# Patient Record
Sex: FEMALE | Race: OTHER | Employment: OTHER | URBAN - METROPOLITAN AREA
[De-identification: names, ages, dates, MRNs, and addresses within clinical notes are randomized per-mention and may not be internally consistent; named-entity substitution may affect disease eponyms.]

---

## 2018-03-06 ENCOUNTER — HOSPITAL ENCOUNTER (OUTPATIENT)
Age: 78
Setting detail: OBSERVATION
Discharge: HOME | End: 2018-03-07
Attending: EMERGENCY MEDICINE | Admitting: EMERGENCY MEDICINE
Payer: MEDICARE

## 2018-03-06 ENCOUNTER — APPOINTMENT (OUTPATIENT)
Dept: GENERAL RADIOLOGY | Age: 78
End: 2018-03-06
Payer: MEDICARE

## 2018-03-06 ENCOUNTER — APPOINTMENT (OUTPATIENT)
Dept: ULTRASOUND IMAGING | Age: 78
End: 2018-03-06
Payer: MEDICARE

## 2018-03-06 DIAGNOSIS — I10 HYPERTENSION, UNSPECIFIED TYPE: ICD-10-CM

## 2018-03-06 DIAGNOSIS — R79.89 ELEVATED SERUM CREATININE: ICD-10-CM

## 2018-03-06 DIAGNOSIS — R19.7 DIARRHEA, UNSPECIFIED TYPE: Primary | ICD-10-CM

## 2018-03-06 DIAGNOSIS — R53.83 FATIGUE, UNSPECIFIED TYPE: ICD-10-CM

## 2018-03-06 DIAGNOSIS — R11.0 NAUSEA: ICD-10-CM

## 2018-03-06 LAB
-: NORMAL
ABSOLUTE EOS #: 0.08 K/UL (ref 0–0.44)
ABSOLUTE IMMATURE GRANULOCYTE: <0.03 K/UL (ref 0–0.3)
ABSOLUTE LYMPH #: 1.19 K/UL (ref 1.1–3.7)
ABSOLUTE MONO #: 0.91 K/UL (ref 0.1–1.2)
AMORPHOUS: NORMAL
ANION GAP SERPL CALCULATED.3IONS-SCNC: 16 MMOL/L (ref 9–17)
BACTERIA: NORMAL
BASOPHILS # BLD: 1 % (ref 0–2)
BASOPHILS ABSOLUTE: 0.06 K/UL (ref 0–0.2)
BILIRUBIN URINE: NEGATIVE
BUN BLDV-MCNC: 26 MG/DL (ref 8–23)
BUN/CREAT BLD: ABNORMAL (ref 9–20)
CALCIUM SERPL-MCNC: 9.5 MG/DL (ref 8.6–10.4)
CAMPYLOBACTER PCR: NORMAL
CASTS UA: NORMAL /LPF (ref 0–8)
CHLORIDE BLD-SCNC: 99 MMOL/L (ref 98–107)
CO2: 22 MMOL/L (ref 20–31)
COLOR: YELLOW
CREAT SERPL-MCNC: 1.68 MG/DL (ref 0.5–0.9)
CRYSTALS, UA: NORMAL /HPF
DIFFERENTIAL TYPE: ABNORMAL
EKG ATRIAL RATE: 64 BPM
EKG P AXIS: 50 DEGREES
EKG P-R INTERVAL: 134 MS
EKG Q-T INTERVAL: 426 MS
EKG QRS DURATION: 88 MS
EKG QTC CALCULATION (BAZETT): 439 MS
EKG R AXIS: 29 DEGREES
EKG T AXIS: 50 DEGREES
EKG VENTRICULAR RATE: 64 BPM
EOSINOPHILS RELATIVE PERCENT: 1 % (ref 1–4)
EPITHELIAL CELLS UA: NORMAL /HPF (ref 0–5)
GFR AFRICAN AMERICAN: 36 ML/MIN
GFR NON-AFRICAN AMERICAN: 29 ML/MIN
GFR SERPL CREATININE-BSD FRML MDRD: ABNORMAL ML/MIN/{1.73_M2}
GFR SERPL CREATININE-BSD FRML MDRD: ABNORMAL ML/MIN/{1.73_M2}
GLUCOSE BLD-MCNC: 102 MG/DL (ref 70–99)
GLUCOSE URINE: NEGATIVE
HCT VFR BLD CALC: 42.2 % (ref 36.3–47.1)
HEMOGLOBIN: 13.2 G/DL (ref 11.9–15.1)
IMMATURE GRANULOCYTES: 0 %
KETONES, URINE: NEGATIVE
LACTOFERRIN, QUAL: NORMAL
LEUKOCYTE ESTERASE, URINE: NEGATIVE
LYMPHOCYTES # BLD: 18 % (ref 24–43)
Lab: NORMAL
MAGNESIUM: 2.1 MG/DL (ref 1.6–2.6)
MCH RBC QN AUTO: 27.7 PG (ref 25.2–33.5)
MCHC RBC AUTO-ENTMCNC: 31.3 G/DL (ref 28.4–34.8)
MCV RBC AUTO: 88.7 FL (ref 82.6–102.9)
MICRO OVA & PARASITES: NORMAL
MONOCYTES # BLD: 14 % (ref 3–12)
MUCUS: NORMAL
NITRITE, URINE: NEGATIVE
NRBC AUTOMATED: 0 PER 100 WBC
OTHER OBSERVATIONS UA: NORMAL
PDW BLD-RTO: 14.7 % (ref 11.8–14.4)
PH UA: 5.5 (ref 5–8)
PLATELET # BLD: 144 K/UL (ref 138–453)
PLATELET ESTIMATE: ABNORMAL
PMV BLD AUTO: 11.2 FL (ref 8.1–13.5)
POC TROPONIN I: 0 NG/ML (ref 0–0.1)
POC TROPONIN INTERP: NORMAL
POTASSIUM SERPL-SCNC: 4.6 MMOL/L (ref 3.7–5.3)
PROTEIN UA: NEGATIVE
RBC # BLD: 4.76 M/UL (ref 3.95–5.11)
RBC # BLD: ABNORMAL 10*6/UL
RBC UA: NORMAL /HPF (ref 0–4)
RENAL EPITHELIAL, UA: NORMAL /HPF
SALMONELLA PCR: NORMAL
SEG NEUTROPHILS: 66 % (ref 36–65)
SEGMENTED NEUTROPHILS ABSOLUTE COUNT: 4.44 K/UL (ref 1.5–8.1)
SHIGATOXIN GENE PCR: NORMAL
SHIGELLA SP PCR: NORMAL
SODIUM BLD-SCNC: 137 MMOL/L (ref 135–144)
SPECIFIC GRAVITY UA: 1.01 (ref 1–1.03)
SPECIMEN DESCRIPTION: NORMAL
SPECIMEN: NORMAL
STATUS: NORMAL
TRICHOMONAS: NORMAL
TURBIDITY: CLEAR
URINE HGB: NEGATIVE
UROBILINOGEN, URINE: NORMAL
WBC # BLD: 6.7 K/UL (ref 3.5–11.3)
WBC # BLD: ABNORMAL 10*3/UL
WBC UA: NORMAL /HPF (ref 0–5)
YEAST: NORMAL

## 2018-03-06 PROCEDURE — 81001 URINALYSIS AUTO W/SCOPE: CPT

## 2018-03-06 PROCEDURE — 81050 URINALYSIS VOLUME MEASURE: CPT

## 2018-03-06 PROCEDURE — 87086 URINE CULTURE/COLONY COUNT: CPT

## 2018-03-06 PROCEDURE — 87328 CRYPTOSPORIDIUM AG IA: CPT

## 2018-03-06 PROCEDURE — 2580000003 HC RX 258: Performed by: STUDENT IN AN ORGANIZED HEALTH CARE EDUCATION/TRAINING PROGRAM

## 2018-03-06 PROCEDURE — 6370000000 HC RX 637 (ALT 250 FOR IP): Performed by: EMERGENCY MEDICINE

## 2018-03-06 PROCEDURE — 84484 ASSAY OF TROPONIN QUANT: CPT

## 2018-03-06 PROCEDURE — G0378 HOSPITAL OBSERVATION PER HR: HCPCS

## 2018-03-06 PROCEDURE — 2500000003 HC RX 250 WO HCPCS: Performed by: NURSE PRACTITIONER

## 2018-03-06 PROCEDURE — 87140 CULTURE TYPE IMMUNOFLUORESC: CPT

## 2018-03-06 PROCEDURE — 87505 NFCT AGENT DETECTION GI: CPT

## 2018-03-06 PROCEDURE — G0480 DRUG TEST DEF 1-7 CLASSES: HCPCS

## 2018-03-06 PROCEDURE — 87329 GIARDIA AG IA: CPT

## 2018-03-06 PROCEDURE — 6370000000 HC RX 637 (ALT 250 FOR IP): Performed by: NURSE PRACTITIONER

## 2018-03-06 PROCEDURE — 76770 US EXAM ABDO BACK WALL COMP: CPT

## 2018-03-06 PROCEDURE — 80048 BASIC METABOLIC PNL TOTAL CA: CPT

## 2018-03-06 PROCEDURE — 87300 AG DETECTION POLYVAL IF: CPT

## 2018-03-06 PROCEDURE — 83735 ASSAY OF MAGNESIUM: CPT

## 2018-03-06 PROCEDURE — 87015 SPECIMEN INFECT AGNT CONCNTJ: CPT

## 2018-03-06 PROCEDURE — 83630 LACTOFERRIN FECAL (QUAL): CPT

## 2018-03-06 PROCEDURE — 87210 SMEAR WET MOUNT SALINE/INK: CPT

## 2018-03-06 PROCEDURE — 84300 ASSAY OF URINE SODIUM: CPT

## 2018-03-06 PROCEDURE — 71046 X-RAY EXAM CHEST 2 VIEWS: CPT

## 2018-03-06 PROCEDURE — 85025 COMPLETE CBC W/AUTO DIFF WBC: CPT

## 2018-03-06 PROCEDURE — 93005 ELECTROCARDIOGRAM TRACING: CPT

## 2018-03-06 PROCEDURE — 87252 VIRUS INOCULATION TISSUE: CPT

## 2018-03-06 PROCEDURE — 96375 TX/PRO/DX INJ NEW DRUG ADDON: CPT

## 2018-03-06 PROCEDURE — 87209 SMEAR COMPLEX STAIN: CPT

## 2018-03-06 PROCEDURE — 96365 THER/PROPH/DIAG IV INF INIT: CPT

## 2018-03-06 PROCEDURE — 6360000002 HC RX W HCPCS: Performed by: STUDENT IN AN ORGANIZED HEALTH CARE EDUCATION/TRAINING PROGRAM

## 2018-03-06 PROCEDURE — 99285 EMERGENCY DEPT VISIT HI MDM: CPT

## 2018-03-06 RX ORDER — SODIUM CHLORIDE 0.9 % (FLUSH) 0.9 %
10 SYRINGE (ML) INJECTION EVERY 12 HOURS SCHEDULED
Status: DISCONTINUED | OUTPATIENT
Start: 2018-03-06 | End: 2018-03-07 | Stop reason: HOSPADM

## 2018-03-06 RX ORDER — AMLODIPINE BESYLATE 5 MG/1
5 TABLET ORAL NIGHTLY
COMMUNITY

## 2018-03-06 RX ORDER — SULFAMETHOXAZOLE AND TRIMETHOPRIM 800; 160 MG/1; MG/1
1 TABLET ORAL 2 TIMES DAILY
COMMUNITY
Start: 2018-02-27 | End: 2018-03-09

## 2018-03-06 RX ORDER — CLONAZEPAM 1 MG/1
1 TABLET ORAL NIGHTLY
Status: DISCONTINUED | OUTPATIENT
Start: 2018-03-06 | End: 2018-03-07 | Stop reason: HOSPADM

## 2018-03-06 RX ORDER — PROMETHAZINE HYDROCHLORIDE 25 MG/ML
12.5 INJECTION, SOLUTION INTRAMUSCULAR; INTRAVENOUS ONCE
Status: DISCONTINUED | OUTPATIENT
Start: 2018-03-06 | End: 2018-03-06

## 2018-03-06 RX ORDER — ACETAMINOPHEN 325 MG/1
650 TABLET ORAL EVERY 4 HOURS PRN
Status: DISCONTINUED | OUTPATIENT
Start: 2018-03-06 | End: 2018-03-07 | Stop reason: HOSPADM

## 2018-03-06 RX ORDER — AMLODIPINE BESYLATE 5 MG/1
5 TABLET ORAL DAILY
Status: DISCONTINUED | OUTPATIENT
Start: 2018-03-06 | End: 2018-03-07 | Stop reason: HOSPADM

## 2018-03-06 RX ORDER — SULFAMETHOXAZOLE AND TRIMETHOPRIM 800; 160 MG/1; MG/1
1 TABLET ORAL 2 TIMES DAILY
Status: DISCONTINUED | OUTPATIENT
Start: 2018-03-06 | End: 2018-03-06

## 2018-03-06 RX ORDER — ONDANSETRON 2 MG/ML
4 INJECTION INTRAMUSCULAR; INTRAVENOUS ONCE
Status: COMPLETED | OUTPATIENT
Start: 2018-03-06 | End: 2018-03-06

## 2018-03-06 RX ORDER — SODIUM CHLORIDE 0.9 % (FLUSH) 0.9 %
10 SYRINGE (ML) INJECTION PRN
Status: DISCONTINUED | OUTPATIENT
Start: 2018-03-06 | End: 2018-03-07 | Stop reason: HOSPADM

## 2018-03-06 RX ORDER — FLUOXETINE 10 MG/1
20 CAPSULE ORAL DAILY
COMMUNITY

## 2018-03-06 RX ORDER — SODIUM CHLORIDE 9 MG/ML
INJECTION, SOLUTION INTRAVENOUS CONTINUOUS
Status: DISCONTINUED | OUTPATIENT
Start: 2018-03-06 | End: 2018-03-07 | Stop reason: HOSPADM

## 2018-03-06 RX ORDER — CLONAZEPAM 1 MG/1
1 TABLET ORAL NIGHTLY PRN
COMMUNITY

## 2018-03-06 RX ORDER — PANTOPRAZOLE SODIUM 40 MG/1
40 TABLET, DELAYED RELEASE ORAL
Status: DISCONTINUED | OUTPATIENT
Start: 2018-03-07 | End: 2018-03-07 | Stop reason: HOSPADM

## 2018-03-06 RX ORDER — FLUOXETINE 10 MG/1
10 CAPSULE ORAL DAILY
Status: DISCONTINUED | OUTPATIENT
Start: 2018-03-06 | End: 2018-03-07 | Stop reason: HOSPADM

## 2018-03-06 RX ADMIN — METOPROLOL TARTRATE 25 MG: 25 TABLET ORAL at 20:40

## 2018-03-06 RX ADMIN — METRONIDAZOLE 500 MG: 500 INJECTION, SOLUTION INTRAVENOUS at 19:15

## 2018-03-06 RX ADMIN — BISACODYL 20 MG: 5 TABLET, DELAYED RELEASE ORAL at 20:48

## 2018-03-06 RX ADMIN — AMLODIPINE BESYLATE 5 MG: 5 TABLET ORAL at 20:40

## 2018-03-06 RX ADMIN — SODIUM CHLORIDE: 9 INJECTION, SOLUTION INTRAVENOUS at 16:30

## 2018-03-06 RX ADMIN — CLONAZEPAM 1 MG: 1 TABLET ORAL at 20:40

## 2018-03-06 RX ADMIN — ONDANSETRON 4 MG: 2 INJECTION INTRAMUSCULAR; INTRAVENOUS at 19:10

## 2018-03-06 RX ADMIN — POLYETHYLENE GLYCOL 3350, SODIUM SULFATE ANHYDROUS, SODIUM BICARBONATE, SODIUM CHLORIDE, POTASSIUM CHLORIDE 4000 ML: 236; 22.74; 6.74; 5.86; 2.97 POWDER, FOR SOLUTION ORAL at 19:10

## 2018-03-07 ENCOUNTER — ANESTHESIA (OUTPATIENT)
Dept: ENDOSCOPY | Age: 78
End: 2018-03-07
Payer: MEDICARE

## 2018-03-07 ENCOUNTER — ANESTHESIA EVENT (OUTPATIENT)
Dept: ENDOSCOPY | Age: 78
End: 2018-03-07
Payer: MEDICARE

## 2018-03-07 VITALS
DIASTOLIC BLOOD PRESSURE: 46 MMHG | SYSTOLIC BLOOD PRESSURE: 120 MMHG | OXYGEN SATURATION: 95 % | RESPIRATION RATE: 18 BRPM

## 2018-03-07 VITALS
WEIGHT: 151.3 LBS | DIASTOLIC BLOOD PRESSURE: 60 MMHG | RESPIRATION RATE: 18 BRPM | OXYGEN SATURATION: 99 % | TEMPERATURE: 98.2 F | HEIGHT: 60 IN | BODY MASS INDEX: 29.7 KG/M2 | SYSTOLIC BLOOD PRESSURE: 128 MMHG | HEART RATE: 67 BPM

## 2018-03-07 LAB
ANION GAP SERPL CALCULATED.3IONS-SCNC: 13 MMOL/L (ref 9–17)
BUN BLDV-MCNC: 13 MG/DL (ref 8–23)
BUN/CREAT BLD: ABNORMAL (ref 9–20)
CALCIUM SERPL-MCNC: 8.5 MG/DL (ref 8.6–10.4)
CHLORIDE BLD-SCNC: 105 MMOL/L (ref 98–107)
CO2: 21 MMOL/L (ref 20–31)
CREAT SERPL-MCNC: 1.32 MG/DL (ref 0.5–0.9)
CULTURE: NORMAL
CULTURE: NORMAL
DIRECT EXAM: NORMAL
GFR AFRICAN AMERICAN: 47 ML/MIN
GFR NON-AFRICAN AMERICAN: 39 ML/MIN
GFR SERPL CREATININE-BSD FRML MDRD: ABNORMAL ML/MIN/{1.73_M2}
GFR SERPL CREATININE-BSD FRML MDRD: ABNORMAL ML/MIN/{1.73_M2}
GLUCOSE BLD-MCNC: 116 MG/DL (ref 70–99)
HOURS COLLECTED: 24 H
Lab: NORMAL
Lab: NORMAL
POTASSIUM SERPL-SCNC: 4.4 MMOL/L (ref 3.7–5.3)
SODIUM 24 HOUR URINE: 86 MMOL/24 H (ref 40–220)
SODIUM BLD-SCNC: 139 MMOL/L (ref 135–144)
SODIUM TIMED UR: NORMAL MMOL/X H
SODIUM URINE: 64 MMOL/L
SPECIMEN DESCRIPTION: NORMAL
SPECIMEN DESCRIPTION: NORMAL
STATUS: NORMAL
STATUS: NORMAL
VOLUME: 1346 ML

## 2018-03-07 PROCEDURE — 87077 CULTURE AEROBIC IDENTIFY: CPT

## 2018-03-07 PROCEDURE — 7100000011 HC PHASE II RECOVERY - ADDTL 15 MIN: Performed by: INTERNAL MEDICINE

## 2018-03-07 PROCEDURE — 6370000000 HC RX 637 (ALT 250 FOR IP): Performed by: EMERGENCY MEDICINE

## 2018-03-07 PROCEDURE — 6370000000 HC RX 637 (ALT 250 FOR IP): Performed by: STUDENT IN AN ORGANIZED HEALTH CARE EDUCATION/TRAINING PROGRAM

## 2018-03-07 PROCEDURE — 36415 COLL VENOUS BLD VENIPUNCTURE: CPT

## 2018-03-07 PROCEDURE — 3700000001 HC ADD 15 MINUTES (ANESTHESIA): Performed by: INTERNAL MEDICINE

## 2018-03-07 PROCEDURE — 3609012400 HC EGD TRANSORAL BIOPSY SINGLE/MULTIPLE: Performed by: INTERNAL MEDICINE

## 2018-03-07 PROCEDURE — G0378 HOSPITAL OBSERVATION PER HR: HCPCS

## 2018-03-07 PROCEDURE — 3609010300 HC COLONOSCOPY W/BIOPSY SINGLE/MULTIPLE: Performed by: INTERNAL MEDICINE

## 2018-03-07 PROCEDURE — 88305 TISSUE EXAM BY PATHOLOGIST: CPT

## 2018-03-07 PROCEDURE — 6370000000 HC RX 637 (ALT 250 FOR IP): Performed by: INTERNAL MEDICINE

## 2018-03-07 PROCEDURE — 7100000010 HC PHASE II RECOVERY - FIRST 15 MIN: Performed by: INTERNAL MEDICINE

## 2018-03-07 PROCEDURE — 6360000002 HC RX W HCPCS: Performed by: STUDENT IN AN ORGANIZED HEALTH CARE EDUCATION/TRAINING PROGRAM

## 2018-03-07 PROCEDURE — 6360000002 HC RX W HCPCS: Performed by: NURSE ANESTHETIST, CERTIFIED REGISTERED

## 2018-03-07 PROCEDURE — 3700000000 HC ANESTHESIA ATTENDED CARE: Performed by: INTERNAL MEDICINE

## 2018-03-07 PROCEDURE — 2500000003 HC RX 250 WO HCPCS: Performed by: NURSE ANESTHETIST, CERTIFIED REGISTERED

## 2018-03-07 PROCEDURE — 80048 BASIC METABOLIC PNL TOTAL CA: CPT

## 2018-03-07 PROCEDURE — 76937 US GUIDE VASCULAR ACCESS: CPT

## 2018-03-07 RX ORDER — CHOLESTYRAMINE LIGHT 4 G/5.7G
4 POWDER, FOR SUSPENSION ORAL NIGHTLY
Qty: 60 PACKET | Refills: 3 | Status: SHIPPED | OUTPATIENT
Start: 2018-03-07

## 2018-03-07 RX ORDER — METRONIDAZOLE 500 MG/1
500 TABLET ORAL EVERY 8 HOURS SCHEDULED
Status: DISCONTINUED | OUTPATIENT
Start: 2018-03-07 | End: 2018-03-07 | Stop reason: HOSPADM

## 2018-03-07 RX ORDER — ONDANSETRON 2 MG/ML
4 INJECTION INTRAMUSCULAR; INTRAVENOUS EVERY 6 HOURS PRN
Status: DISCONTINUED | OUTPATIENT
Start: 2018-03-07 | End: 2018-03-07

## 2018-03-07 RX ORDER — LIDOCAINE HYDROCHLORIDE 10 MG/ML
INJECTION, SOLUTION EPIDURAL; INFILTRATION; INTRACAUDAL; PERINEURAL PRN
Status: DISCONTINUED | OUTPATIENT
Start: 2018-03-07 | End: 2018-03-07 | Stop reason: SDUPTHER

## 2018-03-07 RX ORDER — DIPHENHYDRAMINE HCL 25 MG
25 TABLET ORAL ONCE
Status: COMPLETED | OUTPATIENT
Start: 2018-03-07 | End: 2018-03-07

## 2018-03-07 RX ORDER — PROPOFOL 10 MG/ML
INJECTION, EMULSION INTRAVENOUS PRN
Status: DISCONTINUED | OUTPATIENT
Start: 2018-03-07 | End: 2018-03-07 | Stop reason: SDUPTHER

## 2018-03-07 RX ORDER — METRONIDAZOLE 500 MG/1
500 TABLET ORAL EVERY 8 HOURS SCHEDULED
Qty: 30 TABLET | Refills: 0 | Status: SHIPPED | OUTPATIENT
Start: 2018-03-07 | End: 2018-03-17

## 2018-03-07 RX ORDER — ONDANSETRON 4 MG/1
4 TABLET, FILM COATED ORAL EVERY 8 HOURS PRN
Status: DISCONTINUED | OUTPATIENT
Start: 2018-03-07 | End: 2018-03-07 | Stop reason: HOSPADM

## 2018-03-07 RX ORDER — CHOLESTYRAMINE LIGHT 4 G/5.7G
4 POWDER, FOR SUSPENSION ORAL NIGHTLY
Status: DISCONTINUED | OUTPATIENT
Start: 2018-03-07 | End: 2018-03-07 | Stop reason: HOSPADM

## 2018-03-07 RX ORDER — PANTOPRAZOLE SODIUM 40 MG/1
40 TABLET, DELAYED RELEASE ORAL
Qty: 30 TABLET | Refills: 3 | Status: SHIPPED | OUTPATIENT
Start: 2018-03-08

## 2018-03-07 RX ORDER — PROPOFOL 10 MG/ML
INJECTION, EMULSION INTRAVENOUS CONTINUOUS PRN
Status: DISCONTINUED | OUTPATIENT
Start: 2018-03-07 | End: 2018-03-07 | Stop reason: SDUPTHER

## 2018-03-07 RX ADMIN — PROPOFOL 100 MG: 10 INJECTION, EMULSION INTRAVENOUS at 10:27

## 2018-03-07 RX ADMIN — PANTOPRAZOLE SODIUM 40 MG: 40 TABLET, DELAYED RELEASE ORAL at 12:26

## 2018-03-07 RX ADMIN — METRONIDAZOLE 500 MG: 500 TABLET, FILM COATED ORAL at 12:26

## 2018-03-07 RX ADMIN — METRONIDAZOLE 500 MG: 500 TABLET, FILM COATED ORAL at 01:23

## 2018-03-07 RX ADMIN — AMLODIPINE BESYLATE 5 MG: 5 TABLET ORAL at 12:26

## 2018-03-07 RX ADMIN — PROPOFOL 100 MCG/KG/MIN: 10 INJECTION, EMULSION INTRAVENOUS at 10:27

## 2018-03-07 RX ADMIN — ONDANSETRON 4 MG: 4 TABLET, FILM COATED ORAL at 01:23

## 2018-03-07 RX ADMIN — LIDOCAINE HYDROCHLORIDE 50 MG: 10 INJECTION, SOLUTION EPIDURAL; INFILTRATION; INTRACAUDAL; PERINEURAL at 10:27

## 2018-03-07 RX ADMIN — FLUOXETINE 10 MG: 10 CAPSULE ORAL at 12:26

## 2018-03-07 RX ADMIN — DIPHENHYDRAMINE HCL 25 MG: 25 TABLET ORAL at 01:24

## 2018-03-07 RX ADMIN — METOPROLOL TARTRATE 25 MG: 25 TABLET ORAL at 12:26

## 2018-03-07 ASSESSMENT — PAIN - FUNCTIONAL ASSESSMENT: PAIN_FUNCTIONAL_ASSESSMENT: 0-10

## 2018-03-07 ASSESSMENT — PAIN SCALES - GENERAL
PAINLEVEL_OUTOF10: 0

## 2018-03-07 ASSESSMENT — ENCOUNTER SYMPTOMS
SHORTNESS OF BREATH: 0
STRIDOR: 0

## 2018-03-07 ASSESSMENT — LIFESTYLE VARIABLES: SMOKING_STATUS: 0

## 2018-03-07 NOTE — ANESTHESIA PRE PROCEDURE
 acetaminophen (TYLENOL) tablet 650 mg  650 mg Oral Q4H PRN Alexa Lieberman MD        0.9 % sodium chloride infusion   Intravenous Continuous Tay Moreno,  mL/hr at 18 1630      pantoprazole (PROTONIX) tablet 40 mg  40 mg Oral QAM AC Malka Davison MD           Allergies:  No Known Allergies    Problem List:    Patient Active Problem List   Diagnosis Code    Elevated serum creatinine R79.89       Past Medical History:        Diagnosis Date    Anxiety     Arthritis     Hyperlipidemia     Hypertension        Past Surgical History:        Procedure Laterality Date    BREAST LUMPECTOMY Right      SECTION      x 3.    HYSTERECTOMY         Social History:    Social History   Substance Use Topics    Smoking status: Never Smoker    Smokeless tobacco: Never Used    Alcohol use No                                Counseling given: Not Answered      Vital Signs (Current):   Vitals:    18 1316 18 1925 18 0808 18 1004   BP: 130/67 131/69 122/61 (!) 147/61   Pulse: 68 78 61 65   Resp: 16 16 16 20   Temp: 35.8 °C (96.4 °F) 36.3 °C (97.4 °F) 37.1 °C (98.7 °F) 36.2 °C (97.1 °F)   TempSrc: Axillary Oral  Infrared   SpO2: 99% 94% 98% 97%   Weight: 151 lb 4.8 oz (68.6 kg)      Height: 5' (1.524 m)                                                 BP Readings from Last 3 Encounters:   18 (!) 147/61       NPO Status: Time of last liquid consumption: 2359                        Time of last solid consumption: 1500                        Date of last liquid consumption: 18                        Date of last solid food consumption: 18    BMI:   Wt Readings from Last 3 Encounters:   18 151 lb 4.8 oz (68.6 kg)     Body mass index is 29.55 kg/m².     CBC:   Lab Results   Component Value Date    WBC 6.7 2018    RBC 4.76 2018    HGB 13.2 2018    HCT 42.2 2018    MCV 88.7 2018    RDW 14.7 2018     2018       CMP:

## 2018-03-07 NOTE — ANESTHESIA POSTPROCEDURE EVALUATION
Department of Anesthesiology  Postprocedure Note    Patient: Ed Reason  MRN: [id number]  YOB: 1940  Date of evaluation: 3/7/2018  Time:  11:51 AM     Procedure Summary     Date:  03/07/18 Room / Location:  [facility] 04 / [facility]    Anesthesia Start:  2155 Anesthesia Stop:  3499    Procedures:       EGD BIOPSY (N/A )      COLONOSCOPY WITH BIOPSY (N/A ) Diagnosis:  (CHRONIC DIARRHEA)    Surgeon:  Sarika Baker MD Responsible Provider:  Sander Land MD    Anesthesia Type:  general, MAC ASA Status:  2          Anesthesia Type: general, MAC    Jessica Phase I: Jessica Score: 10    Jessica Phase II: Jessica Score: 10    Last vitals: Reviewed and per EMR flowsheets.        Anesthesia Post Evaluation    Patient location during evaluation: PACU  Patient participation: complete - patient participated  Level of consciousness: awake and alert  Pain score: 2  Airway patency: patent  Nausea & Vomiting: no nausea and no vomiting  Complications: no  Cardiovascular status: hemodynamically stable  Respiratory status: acceptable and nasal cannula  Hydration status: stable

## 2018-03-08 LAB — SURGICAL PATHOLOGY REPORT: NORMAL

## 2018-03-10 LAB
3-OH-COTININE URINE: <50 NG/ML
3-OH-COTININE URINE: <50 NG/ML
ANABASINE URINE: <3 NG/ML
ANABASINE URINE: <3 NG/ML
COTININE, URINE: <5 NG/ML
COTININE, URINE: <5 NG/ML
NICOTINE URINE: <2 NG/ML
NICOTINE URINE: <2 NG/ML
NORNICOTINE URINE: <2 NG/ML
NORNICOTINE URINE: <2 NG/ML

## 2018-03-16 LAB
CULTURE: NORMAL
Lab: NORMAL
SPECIMEN DESCRIPTION: NORMAL
STATUS: NORMAL

## 2018-03-19 ENCOUNTER — OFFICE VISIT (OUTPATIENT)
Dept: GASTROENTEROLOGY | Age: 78
End: 2018-03-19
Payer: MEDICARE

## 2018-03-19 VITALS
WEIGHT: 150 LBS | BODY MASS INDEX: 22.73 KG/M2 | HEIGHT: 68 IN | DIASTOLIC BLOOD PRESSURE: 78 MMHG | HEART RATE: 63 BPM | SYSTOLIC BLOOD PRESSURE: 146 MMHG

## 2018-03-19 DIAGNOSIS — K29.51 CHRONIC GASTRITIS WITH BLEEDING, UNSPECIFIED GASTRITIS TYPE: ICD-10-CM

## 2018-03-19 DIAGNOSIS — K52.832 LYMPHOCYTIC COLITIS: ICD-10-CM

## 2018-03-19 DIAGNOSIS — R19.7 DIARRHEA, UNSPECIFIED TYPE: Primary | ICD-10-CM

## 2018-03-19 PROCEDURE — 99214 OFFICE O/P EST MOD 30 MIN: CPT | Performed by: INTERNAL MEDICINE

## 2018-03-19 PROCEDURE — 99203 OFFICE O/P NEW LOW 30 MIN: CPT

## 2018-04-30 ENCOUNTER — OFFICE VISIT (OUTPATIENT)
Dept: GASTROENTEROLOGY | Age: 78
End: 2018-04-30
Payer: MEDICARE

## 2018-04-30 VITALS
BODY MASS INDEX: 28.86 KG/M2 | SYSTOLIC BLOOD PRESSURE: 144 MMHG | DIASTOLIC BLOOD PRESSURE: 80 MMHG | WEIGHT: 147 LBS | HEIGHT: 60 IN | HEART RATE: 66 BPM

## 2018-04-30 DIAGNOSIS — K52.839 MICROSCOPIC COLITIS, UNSPECIFIED MICROSCOPIC COLITIS TYPE: Primary | ICD-10-CM

## 2018-04-30 PROCEDURE — 99213 OFFICE O/P EST LOW 20 MIN: CPT | Performed by: INTERNAL MEDICINE

## 2020-11-11 ENCOUNTER — HOSPITAL ENCOUNTER (INPATIENT)
Age: 80
LOS: 5 days | Discharge: HOME | DRG: 439 | End: 2020-11-16
Attending: EMERGENCY MEDICINE | Admitting: EMERGENCY MEDICINE
Payer: MEDICARE

## 2020-11-11 ENCOUNTER — APPOINTMENT (OUTPATIENT)
Dept: CT IMAGING | Age: 80
DRG: 439 | End: 2020-11-11
Payer: MEDICARE

## 2020-11-11 PROBLEM — K85.90 ACUTE RECURRENT PANCREATITIS: Status: ACTIVE | Noted: 2020-11-11

## 2020-11-11 LAB
-: NORMAL
ALBUMIN SERPL-MCNC: 3.7 G/DL (ref 3.5–5.2)
ALBUMIN/GLOBULIN RATIO: 1.2 (ref 1–2.5)
ALP BLD-CCNC: 91 U/L (ref 35–104)
ALT SERPL-CCNC: 8 U/L (ref 5–33)
AMORPHOUS: NORMAL
ANION GAP SERPL CALCULATED.3IONS-SCNC: 14 MMOL/L (ref 9–17)
AST SERPL-CCNC: 14 U/L
BACTERIA: NORMAL
BILIRUB SERPL-MCNC: 0.41 MG/DL (ref 0.3–1.2)
BILIRUBIN DIRECT: 0.18 MG/DL
BILIRUBIN URINE: NEGATIVE
BILIRUBIN, INDIRECT: 0.23 MG/DL (ref 0–1)
BUN BLDV-MCNC: 14 MG/DL (ref 8–23)
BUN/CREAT BLD: ABNORMAL (ref 9–20)
CALCIUM SERPL-MCNC: 9.3 MG/DL (ref 8.6–10.4)
CASTS UA: NORMAL /LPF (ref 0–8)
CHLORIDE BLD-SCNC: 99 MMOL/L (ref 98–107)
CO2: 22 MMOL/L (ref 20–31)
COLOR: ABNORMAL
COMMENT UA: ABNORMAL
CREAT SERPL-MCNC: 1.33 MG/DL (ref 0.5–0.9)
CRYSTALS, UA: NORMAL /HPF
EPITHELIAL CELLS UA: NORMAL /HPF (ref 0–5)
GFR AFRICAN AMERICAN: 46 ML/MIN
GFR NON-AFRICAN AMERICAN: 38 ML/MIN
GFR SERPL CREATININE-BSD FRML MDRD: ABNORMAL ML/MIN/{1.73_M2}
GFR SERPL CREATININE-BSD FRML MDRD: ABNORMAL ML/MIN/{1.73_M2}
GLOBULIN: NORMAL G/DL (ref 1.5–3.8)
GLUCOSE BLD-MCNC: 109 MG/DL (ref 70–99)
GLUCOSE URINE: NEGATIVE
HCT VFR BLD CALC: 43.3 % (ref 36.3–47.1)
HEMOGLOBIN: 13.6 G/DL (ref 11.9–15.1)
KETONES, URINE: ABNORMAL
LEUKOCYTE ESTERASE, URINE: ABNORMAL
LIPASE: 233 U/L (ref 13–60)
MCH RBC QN AUTO: 26.3 PG (ref 25.2–33.5)
MCHC RBC AUTO-ENTMCNC: 31.4 G/DL (ref 28.4–34.8)
MCV RBC AUTO: 83.6 FL (ref 82.6–102.9)
MUCUS: NORMAL
NITRITE, URINE: NEGATIVE
NRBC AUTOMATED: 0 PER 100 WBC
OTHER OBSERVATIONS UA: NORMAL
PDW BLD-RTO: 16.9 % (ref 11.8–14.4)
PH UA: 5 (ref 5–8)
PLATELET # BLD: 191 K/UL (ref 138–453)
PMV BLD AUTO: 11.7 FL (ref 8.1–13.5)
POTASSIUM SERPL-SCNC: 3.8 MMOL/L (ref 3.7–5.3)
PROTEIN UA: ABNORMAL
RBC # BLD: 5.18 M/UL (ref 3.95–5.11)
RBC UA: NORMAL /HPF (ref 0–4)
RENAL EPITHELIAL, UA: NORMAL /HPF
SODIUM BLD-SCNC: 135 MMOL/L (ref 135–144)
SPECIFIC GRAVITY UA: 1.03 (ref 1–1.03)
TOTAL PROTEIN: 6.8 G/DL (ref 6.4–8.3)
TRICHOMONAS: NORMAL
TROPONIN INTERP: ABNORMAL
TROPONIN INTERP: ABNORMAL
TROPONIN T: ABNORMAL NG/ML
TROPONIN T: ABNORMAL NG/ML
TROPONIN, HIGH SENSITIVITY: 21 NG/L (ref 0–14)
TROPONIN, HIGH SENSITIVITY: 24 NG/L (ref 0–14)
TURBIDITY: ABNORMAL
URINE HGB: ABNORMAL
UROBILINOGEN, URINE: NORMAL
WBC # BLD: 6.9 K/UL (ref 3.5–11.3)
WBC UA: NORMAL /HPF (ref 0–5)
YEAST: NORMAL

## 2020-11-11 PROCEDURE — 6360000002 HC RX W HCPCS: Performed by: GENERAL PRACTICE

## 2020-11-11 PROCEDURE — 80048 BASIC METABOLIC PNL TOTAL CA: CPT

## 2020-11-11 PROCEDURE — 96375 TX/PRO/DX INJ NEW DRUG ADDON: CPT

## 2020-11-11 PROCEDURE — 6370000000 HC RX 637 (ALT 250 FOR IP): Performed by: GENERAL PRACTICE

## 2020-11-11 PROCEDURE — 96374 THER/PROPH/DIAG INJ IV PUSH: CPT

## 2020-11-11 PROCEDURE — 81001 URINALYSIS AUTO W/SCOPE: CPT

## 2020-11-11 PROCEDURE — 84484 ASSAY OF TROPONIN QUANT: CPT

## 2020-11-11 PROCEDURE — 1200000000 HC SEMI PRIVATE

## 2020-11-11 PROCEDURE — 83690 ASSAY OF LIPASE: CPT

## 2020-11-11 PROCEDURE — 99285 EMERGENCY DEPT VISIT HI MDM: CPT

## 2020-11-11 PROCEDURE — 6360000002 HC RX W HCPCS: Performed by: EMERGENCY MEDICINE

## 2020-11-11 PROCEDURE — 6370000000 HC RX 637 (ALT 250 FOR IP): Performed by: STUDENT IN AN ORGANIZED HEALTH CARE EDUCATION/TRAINING PROGRAM

## 2020-11-11 PROCEDURE — 74176 CT ABD & PELVIS W/O CONTRAST: CPT

## 2020-11-11 PROCEDURE — 2580000003 HC RX 258

## 2020-11-11 PROCEDURE — 2580000003 HC RX 258: Performed by: GENERAL PRACTICE

## 2020-11-11 PROCEDURE — 93005 ELECTROCARDIOGRAM TRACING: CPT | Performed by: GENERAL PRACTICE

## 2020-11-11 PROCEDURE — G0378 HOSPITAL OBSERVATION PER HR: HCPCS

## 2020-11-11 PROCEDURE — 6360000002 HC RX W HCPCS: Performed by: STUDENT IN AN ORGANIZED HEALTH CARE EDUCATION/TRAINING PROGRAM

## 2020-11-11 PROCEDURE — 85027 COMPLETE CBC AUTOMATED: CPT

## 2020-11-11 PROCEDURE — 80076 HEPATIC FUNCTION PANEL: CPT

## 2020-11-11 RX ORDER — DEXTROSE AND SODIUM CHLORIDE 5; .45 G/100ML; G/100ML
INJECTION, SOLUTION INTRAVENOUS CONTINUOUS
Status: DISCONTINUED | OUTPATIENT
Start: 2020-11-11 | End: 2020-11-12

## 2020-11-11 RX ORDER — 0.9 % SODIUM CHLORIDE 0.9 %
1000 INTRAVENOUS SOLUTION INTRAVENOUS ONCE
Status: COMPLETED | OUTPATIENT
Start: 2020-11-11 | End: 2020-11-11

## 2020-11-11 RX ORDER — PANTOPRAZOLE SODIUM 40 MG/1
40 TABLET, DELAYED RELEASE ORAL
Status: DISCONTINUED | OUTPATIENT
Start: 2020-11-12 | End: 2020-11-16 | Stop reason: HOSPADM

## 2020-11-11 RX ORDER — AMLODIPINE BESYLATE 10 MG/1
5 TABLET ORAL NIGHTLY
Status: DISCONTINUED | OUTPATIENT
Start: 2020-11-11 | End: 2020-11-14

## 2020-11-11 RX ORDER — ONDANSETRON 2 MG/ML
4 INJECTION INTRAMUSCULAR; INTRAVENOUS ONCE
Status: COMPLETED | OUTPATIENT
Start: 2020-11-11 | End: 2020-11-11

## 2020-11-11 RX ORDER — CHOLECALCIFEROL (VITAMIN D3) 125 MCG
10 CAPSULE ORAL NIGHTLY
Status: DISCONTINUED | OUTPATIENT
Start: 2020-11-11 | End: 2020-11-11

## 2020-11-11 RX ORDER — DEXTROSE AND SODIUM CHLORIDE 5; .45 G/100ML; G/100ML
INJECTION, SOLUTION INTRAVENOUS
Status: COMPLETED
Start: 2020-11-11 | End: 2020-11-11

## 2020-11-11 RX ORDER — ACETAMINOPHEN 325 MG/1
650 TABLET ORAL EVERY 4 HOURS PRN
Status: DISCONTINUED | OUTPATIENT
Start: 2020-11-11 | End: 2020-11-16 | Stop reason: HOSPADM

## 2020-11-11 RX ORDER — UREA 10 %
4 LOTION (ML) TOPICAL NIGHTLY
Status: DISCONTINUED | OUTPATIENT
Start: 2020-11-11 | End: 2020-11-16 | Stop reason: HOSPADM

## 2020-11-11 RX ORDER — FLUOXETINE HYDROCHLORIDE 20 MG/1
20 CAPSULE ORAL DAILY
Status: DISCONTINUED | OUTPATIENT
Start: 2020-11-11 | End: 2020-11-16 | Stop reason: HOSPADM

## 2020-11-11 RX ORDER — CEPHALEXIN 500 MG/1
500 CAPSULE ORAL ONCE
Status: COMPLETED | OUTPATIENT
Start: 2020-11-11 | End: 2020-11-11

## 2020-11-11 RX ORDER — PROMETHAZINE HYDROCHLORIDE 25 MG/ML
12.5 INJECTION, SOLUTION INTRAMUSCULAR; INTRAVENOUS EVERY 6 HOURS PRN
Status: DISCONTINUED | OUTPATIENT
Start: 2020-11-11 | End: 2020-11-16 | Stop reason: HOSPADM

## 2020-11-11 RX ORDER — METOPROLOL TARTRATE 50 MG/1
25 TABLET, FILM COATED ORAL NIGHTLY
Status: DISCONTINUED | OUTPATIENT
Start: 2020-11-11 | End: 2020-11-11

## 2020-11-11 RX ORDER — CLONAZEPAM 1 MG/1
1 TABLET ORAL NIGHTLY
Status: DISCONTINUED | OUTPATIENT
Start: 2020-11-11 | End: 2020-11-16 | Stop reason: HOSPADM

## 2020-11-11 RX ORDER — LANOLIN ALCOHOL/MO/W.PET/CERES
6 CREAM (GRAM) TOPICAL NIGHTLY
Status: DISCONTINUED | OUTPATIENT
Start: 2020-11-11 | End: 2020-11-16 | Stop reason: HOSPADM

## 2020-11-11 RX ORDER — ONDANSETRON 2 MG/ML
4 INJECTION INTRAMUSCULAR; INTRAVENOUS EVERY 6 HOURS PRN
Status: DISCONTINUED | OUTPATIENT
Start: 2020-11-11 | End: 2020-11-16 | Stop reason: HOSPADM

## 2020-11-11 RX ORDER — PROCHLORPERAZINE EDISYLATE 5 MG/ML
5 INJECTION INTRAMUSCULAR; INTRAVENOUS EVERY 6 HOURS PRN
Status: DISCONTINUED | OUTPATIENT
Start: 2020-11-11 | End: 2020-11-11

## 2020-11-11 RX ORDER — PROMETHAZINE HYDROCHLORIDE 25 MG/ML
12.5 INJECTION, SOLUTION INTRAMUSCULAR; INTRAVENOUS ONCE
Status: COMPLETED | OUTPATIENT
Start: 2020-11-11 | End: 2020-11-11

## 2020-11-11 RX ADMIN — DEXTROSE AND SODIUM CHLORIDE: 5; 450 INJECTION, SOLUTION INTRAVENOUS at 17:28

## 2020-11-11 RX ADMIN — DEXTROSE AND SODIUM CHLORIDE: 5; 450 INJECTION, SOLUTION INTRAVENOUS at 23:43

## 2020-11-11 RX ADMIN — PROMETHAZINE HYDROCHLORIDE 12.5 MG: 25 INJECTION INTRAMUSCULAR; INTRAVENOUS at 17:28

## 2020-11-11 RX ADMIN — Medication 6 MG: at 22:35

## 2020-11-11 RX ADMIN — ONDANSETRON 4 MG: 2 INJECTION INTRAMUSCULAR; INTRAVENOUS at 12:49

## 2020-11-11 RX ADMIN — CLONAZEPAM 1 MG: 1 TABLET ORAL at 22:34

## 2020-11-11 RX ADMIN — PROMETHAZINE HYDROCHLORIDE 12.5 MG: 25 INJECTION INTRAMUSCULAR; INTRAVENOUS at 13:17

## 2020-11-11 RX ADMIN — AMLODIPINE BESYLATE 5 MG: 10 TABLET ORAL at 22:34

## 2020-11-11 RX ADMIN — ENOXAPARIN SODIUM 30 MG: 30 INJECTION SUBCUTANEOUS at 22:34

## 2020-11-11 RX ADMIN — SODIUM CHLORIDE 1000 ML: 9 INJECTION, SOLUTION INTRAVENOUS at 15:45

## 2020-11-11 RX ADMIN — PROCHLORPERAZINE EDISYLATE 5 MG: 5 INJECTION INTRAMUSCULAR; INTRAVENOUS at 17:29

## 2020-11-11 RX ADMIN — SODIUM CHLORIDE 1000 ML: 9 INJECTION, SOLUTION INTRAVENOUS at 12:49

## 2020-11-11 RX ADMIN — CEPHALEXIN 500 MG: 500 CAPSULE ORAL at 13:48

## 2020-11-11 RX ADMIN — Medication 4 MG: at 22:35

## 2020-11-11 ASSESSMENT — ENCOUNTER SYMPTOMS
NAUSEA: 1
VOMITING: 1
SHORTNESS OF BREATH: 0
BACK PAIN: 0

## 2020-11-11 NOTE — ED NOTES
Patient found standing in her room looking for her bathrobe. Patient re-oriented and helped back into bed. She refuses to have her monitoring equipment put back on. Patient instructed to please remain in bed and call if she needs anything.        Ok Champion RN  11/11/20 6478

## 2020-11-11 NOTE — ED NOTES
Patient's granddaughter called to leave her phone number. Zaynab [phone].      Nelson Herrera RN  11/11/20 2701

## 2020-11-11 NOTE — ED PROVIDER NOTES
[address] ED  Emergency Department Encounter  EmergencyMedicine Resident     Pt Ravi Hunter  MRN: [id number]  Hugo 1940  Date of evaluation: 20  PCP:  [facility]       Chief Complaint   Patient presents with    Nausea     had EGD done last week       HISTORY OF PRESENT ILLNESS  (Location/Symptom, Timing/Onset, Context/Setting, Quality, Duration, Modifying Factors, Severity.)      Niesha Matta is a 79 y.o. female who presents with 1.5 months of nausea without vomiting. She has been worked up at [address] [place] [address] per the patient, underwent EGD 2 weeks ago and was told that she had gastritis. She is on numerous medications, but does not recall what they are. She denies chest pain or shortness of breath. No abdominal pain. States the nausea is constant. She has not vomited at all but states that she feels like she is going to gag when she eats. She says she has lost 10 pounds over the last several months due to loss of appetite    PAST MEDICAL / SURGICAL / SOCIAL / FAMILY HISTORY      has a past medical history of Anxiety, Arthritis, Diarrhea, Hyperlipidemia, Hypertension, and Lymphocytic colitis. Denies further past medical hx     has a past surgical history that includes Hysterectomy;  section; Breast lumpectomy (Right); pr egd transoral biopsy single/multiple (N/A, 3/7/2018); and pr colonoscopy w/biopsy single/multiple (N/A, 3/7/2018).   Denies further past surgical hx    Social History     Socioeconomic History    Marital status: Legally      Spouse name: Not on file    Number of children: Not on file    Years of education: Not on file    Highest education level: Not on file   Occupational History    Not on file   Social Needs    Financial resource strain: Not on file    Food insecurity     Worry: Not on file     Inability: Not on file    Transportation needs     Medical: Not on file     Non-medical: Not on file Negative for shortness of breath. Cardiovascular: Negative for chest pain. Gastrointestinal: Positive for nausea and vomiting. Genitourinary: Negative for dysuria. Musculoskeletal: Negative for back pain. Skin: Negative for rash. Allergic/Immunologic: Negative for immunocompromised state. Neurological: Positive for weakness. Negative for headaches. Hematological: Negative for adenopathy. PHYSICAL EXAM   (up to 7 for level 4, 8 or more for level 5)      INITIAL VITALS:   BP (!) 146/76   Pulse 74   Temp 97.5 °F (36.4 °C) (Skin)   Resp 20   Wt 140 lb (63.5 kg)   SpO2 96%   BMI 27.34 kg/m²     Physical Exam   Gen. Appearance: patient appears well, nondistressed. HEENT: head atraumatic, normocephalic. JENNIFER. Oropharynx clear and moist.  Neck: Supple, normal ROM. No lymphadenopathy. Pulmonary: Lungs clear to auscultation bilaterally. No wheezing, rales or rhonchi   Cardiovascular: Regular rate and rhythm, no murmurs   Abdomen: Soft, nontender, no guarding or rebound, normal bowel sounds  Neurology: GCS 15. Oriented to person place and time.   moving all extremities   Skin: Warm, dry, well perfused        DIFFERENTIAL  DIAGNOSIS     PLAN (LABS / IMAGING / EKG):  Orders Placed This Encounter   Procedures    CT ABDOMEN PELVIS WO CONTRAST Additional Contrast? None    CBC    Basic Metabolic Panel    LIPASE    Hepatic Function Panel    URINALYSIS    Troponin    Microscopic Urinalysis    Troponin    Telemetry Monitoring    EKG 12 Lead    PATIENT STATUS (FROM ED OR OR/PROCEDURAL) Observation       MEDICATIONS ORDERED:  Orders Placed This Encounter   Medications    ondansetron (ZOFRAN) injection 4 mg    0.9 % sodium chloride bolus    promethazine (PHENERGAN) injection 12.5 mg    cephALEXin (KEFLEX) capsule 500 mg     Order Specific Question:   Antimicrobial Indications     Answer:   Urinary Tract Infection    0.9 % sodium chloride bolus           DIAGNOSTIC RESULTS / EMERGENCY DEPARTMENT COURSE / MDM     LABS:  Results for orders placed or performed during the hospital encounter of 11/11/20   CBC   Result Value Ref Range    WBC 6.9 3.5 - 11.3 k/uL    RBC 5.18 (H) 3.95 - 5.11 m/uL    Hemoglobin 13.6 11.9 - 15.1 g/dL    Hematocrit 43.3 36.3 - 47.1 %    MCV 83.6 82.6 - 102.9 fL    MCH 26.3 25.2 - 33.5 pg    MCHC 31.4 28.4 - 34.8 g/dL    RDW 16.9 (H) 11.8 - 14.4 %    Platelets 840 650 - 634 k/uL    MPV 11.7 8.1 - 13.5 fL    NRBC Automated 0.0 0.0 per 100 WBC   Basic Metabolic Panel   Result Value Ref Range    Glucose 109 (H) 70 - 99 mg/dL    BUN 14 8 - 23 mg/dL    CREATININE 1.33 (H) 0.50 - 0.90 mg/dL    Bun/Cre Ratio NOT REPORTED 9 - 20    Calcium 9.3 8.6 - 10.4 mg/dL    Sodium 135 135 - 144 mmol/L    Potassium 3.8 3.7 - 5.3 mmol/L    Chloride 99 98 - 107 mmol/L    CO2 22 20 - 31 mmol/L    Anion Gap 14 9 - 17 mmol/L    GFR Non-African American 38 (L) >60 mL/min    GFR  46 (L) >60 mL/min    GFR Comment          GFR Staging NOT REPORTED    LIPASE   Result Value Ref Range    Lipase 233 (H) 13 - 60 U/L   Hepatic Function Panel   Result Value Ref Range    Alb 3.7 3.5 - 5.2 g/dL    Alkaline Phosphatase 91 35 - 104 U/L    ALT 8 5 - 33 U/L    AST 14 <32 U/L    Total Bilirubin 0.41 0.3 - 1.2 mg/dL    Bilirubin, Direct 0.18 <0.31 mg/dL    Bilirubin, Indirect 0.23 0.00 - 1.00 mg/dL    Total Protein 6.8 6.4 - 8.3 g/dL    Globulin NOT REPORTED 1.5 - 3.8 g/dL    Albumin/Globulin Ratio 1.2 1.0 - 2.5   URINALYSIS   Result Value Ref Range    Color, UA DARK YELLOW (A) YELLOW    Turbidity UA CLOUDY (A) CLEAR    Glucose, Ur NEGATIVE NEGATIVE    Bilirubin Urine NEGATIVE NEGATIVE    Ketones, Urine SMALL (A) NEGATIVE    Specific Gravity, UA 1.027 1.005 - 1.030    Urine Hgb TRACE (A) NEGATIVE    pH, UA 5.0 5.0 - 8.0    Protein, UA 4+ (A) NEGATIVE    Urobilinogen, Urine Normal Normal    Nitrite, Urine NEGATIVE NEGATIVE    Leukocyte Esterase, Urine TRACE (A) NEGATIVE    Urinalysis Comments NOT REPORTED    Troponin   Result Value Ref Range    Troponin, High Sensitivity 24 (H) 0 - 14 ng/L    Troponin T NOT REPORTED <0.03 ng/mL    Troponin Interp NOT REPORTED    Microscopic Urinalysis   Result Value Ref Range    -          WBC, UA 50  0 - 5 /HPF    RBC, UA 2 TO 5 0 - 4 /HPF    Casts UA  0 - 8 /LPF     10 TO 20 HYALINE Reference range defined for non-centrifuged specimen. Crystals, UA NOT REPORTED None /HPF    Epithelial Cells UA 5 TO 10 0 - 5 /HPF    Renal Epithelial, UA NOT REPORTED 0 /HPF    Bacteria, UA NOT REPORTED None    Mucus, UA NOT REPORTED None    Trichomonas, UA NOT REPORTED None    Amorphous, UA NOT REPORTED None    Other Observations UA NOT REPORTED NOT REQ. Yeast, UA NOT REPORTED None   Troponin   Result Value Ref Range    Troponin, High Sensitivity 21 (H) 0 - 14 ng/L    Troponin T NOT REPORTED <0.03 ng/mL    Troponin Interp NOT REPORTED        RADIOLOGY:  None    EKG  EKG Interpretation    Interpreted by me    Rhythm: normal sinus   Rate: normal  Axis: normal  Ectopy: none  Conduction: normal  ST Segments: no acute change  T Waves: Flattened in aVL  Q Waves: none    Clinical Impression: Nonspecific EKG    All EKG's are interpreted by the Emergency Department Physician who either signs or Co-signs this chart in the absence of a cardiologist.    [address] [place] [address] MDM:  79 y.o. female who presents with chronic nausea        ED Course as of Nov 11 1624   Wed Nov 11, 2020   1250 Patient has no abdominal pain. No change in nausea for the past 5 to 6 weeks. Given patient's age and risk factors will obtain EKG and troponin along with basic labs and treat symptomatically with Zofran and IV fluids. Patient is not tachycardic. Not tachypneic. No shortness of breath thus concern for PE is less. Given history of chronic kidney disease will hold off on D-dimer for now. [TERRANCE]   1340 Lipase 233.   Does have a hx of elevated lipase in the past.  Denies alcohol use in the last 6 months but states that she drank \"3 mixed drinks per night with whiskey\" prior to stopping. Records indicate last CT abdomen was September 2020. Will repeat without contrast due to elevated creatinine/CKD    [TERRANCE]   1345 Urine suspicious for UTI. Will treat with Keflex    [TERRANCE]   0653 Pt given zofran and phenergan. Still reporting nausea. Suspect sx secondary to pancreatitis     [TERRANCE]   1622 CT abd overread pending. Pt still symptomatic but has not vomited. Will admit to obs for IVF    [TERRANCE]      ED Course User Index  [TERRANCE] Naga Stock DO         PROCEDURES:  None    CONSULTS:  None    CRITICAL CARE:  None    FINAL IMPRESSION      1. Acute pancreatitis, unspecified complication status, unspecified pancreatitis type    2. Acute cystitis with hematuria            DISPOSITION / PLAN     DISPOSITION        PATIENT REFERRED TO:  No follow-up provider specified.     DISCHARGE MEDICATIONS:  New Prescriptions    No medications on file       Naga Stock DO  Emergency Medicine Resident    (Please note that portions of thisnote were completed with a voice recognition program.  Efforts were made to edit the dictations but occasionally words are mis-transcribed.)        Naga Stock DO  Resident  11/11/20 9918

## 2020-11-11 NOTE — ED PROVIDER NOTES
[facility] ED  Emergency Department  Emergency Medicine Resident Sign-out     Care of Maya Hatfield was assumed from Dr. Karmen Kmi and is being seen for Nausea (had EGD done last week)  . The patient's initial evaluation and plan have been discussed with the prior provider who initially evaluated the patient. EMERGENCY DEPARTMENT COURSE / MEDICAL DECISION MAKING:       MEDICATIONS GIVEN:  Orders Placed This Encounter   Medications    ondansetron (ZOFRAN) injection 4 mg    0.9 % sodium chloride bolus    promethazine (PHENERGAN) injection 12.5 mg    cephALEXin (KEFLEX) capsule 500 mg     Order Specific Question:   Antimicrobial Indications     Answer:   Urinary Tract Infection    0.9 % sodium chloride bolus       LABS / RADIOLOGY:     Labs Reviewed   CBC - Abnormal; Notable for the following components:       Result Value    RBC 5.18 (*)     RDW 16.9 (*)     All other components within normal limits   BASIC METABOLIC PANEL - Abnormal; Notable for the following components:    Glucose 109 (*)     CREATININE 1.33 (*)     GFR Non- 38 (*)     GFR  46 (*)     All other components within normal limits   LIPASE - Abnormal; Notable for the following components:    Lipase 233 (*)     All other components within normal limits   URINALYSIS - Abnormal; Notable for the following components:    Color, UA DARK YELLOW (*)     Turbidity UA CLOUDY (*)     Ketones, Urine SMALL (*)     Urine Hgb TRACE (*)     Protein, UA 4+ (*)     Leukocyte Esterase, Urine TRACE (*)     All other components within normal limits   TROPONIN - Abnormal; Notable for the following components:    Troponin, High Sensitivity 24 (*)     All other components within normal limits   TROPONIN - Abnormal; Notable for the following components:    Troponin, High Sensitivity 21 (*)     All other components within normal limits   HEPATIC FUNCTION PANEL   MICROSCOPIC URINALYSIS       No results found.     RECENT VITALS:     Temp: 97.5 °F (36.4 °C),  Pulse: 74, Resp: 20, BP: (!) 146/76, SpO2: 96 %    This patient is a 79 y.o. Female with chronic abdominal pain ongoing for past 1 month with associated decreased appetite and nausea, no vomiting. Found to have acute pancreatitis with lipase of 233. Admitted to observation unit for pain control, IV fluids, n.p.o. status      OUTSTANDING TASKS / RECOMMENDATIONS:    1. Awaiting bed     FINAL IMPRESSION:     1. Acute pancreatitis, unspecified complication status, unspecified pancreatitis type    2. Acute cystitis with hematuria        DISPOSITION:         DISPOSITION:  []  Discharge   []  Transfer -    [x]  Admission -   ETU   []  Against Medical Advice   []  Eloped   FOLLOW-UP: No follow-up provider specified.    DISCHARGE MEDICATIONS: New Prescriptions    No medications on file          Uri Almonte DO  Emergency Medicine Resident  [address], [address] [facility]  Resident  11/11/20 [address] [facility],[address],   Resident  11/12/20 6759

## 2020-11-11 NOTE — ED NOTES
Patient ambulates to restroom with steady gait. Reports continued nausea.      Estevan So RN  11/11/20 2041

## 2020-11-11 NOTE — ED NOTES
Patient to ED with complaint of nausea. She report she had extesive testing done at [facility] including an EGD, states she needs nausea medications to take home with her. The patient denies any pain, reports her nausea is persistent once she gets released from hospital.  She is alert and oriented x4, placed in gown and on full cardiac monitor.      Sharri Ortiz RN  11/11/20 5903

## 2020-11-11 NOTE — ED PROVIDER NOTES
[facility]     Emergency Department     Faculty Attestation    I performed a history and physical examination of the patient and discussed management with the resident. I have reviewed and agree with the residents findings including all diagnostic interpretations, and treatment plans as written at the time of my review. Any areas of disagreement are noted on the chart. I was personally present for the key portions of any procedures. I have documented in the chart those procedures where I was not present during the key portions. For Physician Assistant/ Nurse Practitioner cases/documentation I have personally evaluated this patient and have completed at least one if not all key elements of the E/M (history, physical exam, and MDM). Additional findings are as noted. This patient was evaluated in the Emergency Department for symptoms described in the history of present illness. The patient was evaluated in the context of the global COVID-19 pandemic, which necessitated consideration that the patient might be at risk for infection with the SARS-CoV-2 virus that causes COVID-19. Institutional protocols and algorithms that pertain to the evaluation of patients at risk for COVID-19 are in a state of rapid change based on information released by regulatory bodies including the CDC and federal and state organizations. These policies and algorithms were followed during the patient's care in the ED. Primary Care Physician: Bella Noland    History: This is a 79 y.o. female who presents to the Emergency Department with complaint of nausea. The patient presents emergent complaint of nausea that she has had for the last 1-1/2 months. She denies any vomiting. She denies any chest pain. Says when the nausea is worse in the morning she does feel slight shortness of breath. She denies any abdominal pain. Denies any constipation diarrhea.   She does complain of some dark urination. Physical:   weight is 140 lb (63.5 kg). Her skin temperature is 97.5 °F (36.4 °C). Her blood pressure is 154/81 (abnormal) and her pulse is 81. Her respiration is 22 and oxygen saturation is 96%. Lungs are clear to auscultation bilateral, heart regular rate and rhythm, abdomen is soft nontender    Impression: Nausea    Plan: CBC, CMP, EKG, troponin, urinalysis      EKG Interpretation    Interpreted by me  Normal sinus rhythm ventricular 80, normal RI interval, normal QRS duration, normal axis, nonspecific ST and T wave abnormality  Compared to EKG of March 6, 2018          (Please note that portions of this note were completed with a voice recognition program.  Efforts were made to edit the dictations but occasionally words are mis-transcribed.)    Kitty Germain.  Cristina Zelaya MD, [facility]  Attending Emergency Medicine Physician        Lauri Barger MD  11/11/20 9697

## 2020-11-11 NOTE — ED NOTES
Patient placed in hospital bed from [facility]. She refuses any more zofran, states it never helps any time that she gets it including during todays visit. Observation resident notified.          Kina Hutchins RN  11/11/20 5503

## 2020-11-11 NOTE — ED NOTES
Patient to and from restroom with steady gait. Placed back on continuous monitor.        Madyson Campbell RN  11/11/20 6818

## 2020-11-11 NOTE — ED NOTES
Patient acting disoriented, pulled off her monitoring cords. When asked where she was going she told me she was looking for her daughter because she was at her daughters house. Once re-oriented to situation and place, she was able to answer correctly about fifteen minutes later. She was placed back on continuous monitor and covered up with blankets. Observation resident aware.      Brooks Prado RN  11/11/20 2179

## 2020-11-11 NOTE — PROGRESS NOTES
Pharmacy Note     Renal Dose Adjustment    Myles Parnell is a 79 y.o. female. Pharmacist assessment of renally cleared medications. Recent Labs     11/11/20  1244   BUN 14       Recent Labs     11/11/20  1244   CREATININE 1.33*       Estimated Creatinine Clearance: 28 mL/min (A) (based on SCr of 1.33 mg/dL (H)). Height:   Ht Readings from Last 1 Encounters:   11/11/20 5' (1.524 m)     Weight:  Wt Readings from Last 1 Encounters:   11/11/20 140 lb (63.5 kg)       The following medication dose has been adjusted based upon renal function per P&T Guidelines:             Enoxaparin decreased to 30 mg SC daily for CrCl below 30 ml/min.     Joshua Foster, PharmD, BCPS  11/11/2020  5:14 PM

## 2020-11-12 ENCOUNTER — APPOINTMENT (OUTPATIENT)
Dept: CT IMAGING | Age: 80
DRG: 439 | End: 2020-11-12
Payer: MEDICARE

## 2020-11-12 LAB
ANION GAP SERPL CALCULATED.3IONS-SCNC: 11 MMOL/L (ref 9–17)
BUN BLDV-MCNC: 6 MG/DL (ref 8–23)
BUN/CREAT BLD: ABNORMAL (ref 9–20)
CALCIUM SERPL-MCNC: 8.4 MG/DL (ref 8.6–10.4)
CHLORIDE BLD-SCNC: 106 MMOL/L (ref 98–107)
CO2: 22 MMOL/L (ref 20–31)
CREAT SERPL-MCNC: 1.09 MG/DL (ref 0.5–0.9)
EKG ATRIAL RATE: 80 BPM
EKG P AXIS: 88 DEGREES
EKG P-R INTERVAL: 140 MS
EKG Q-T INTERVAL: 412 MS
EKG QRS DURATION: 84 MS
EKG QTC CALCULATION (BAZETT): 475 MS
EKG R AXIS: 24 DEGREES
EKG T AXIS: 51 DEGREES
EKG VENTRICULAR RATE: 80 BPM
GFR AFRICAN AMERICAN: 58 ML/MIN
GFR NON-AFRICAN AMERICAN: 48 ML/MIN
GFR SERPL CREATININE-BSD FRML MDRD: ABNORMAL ML/MIN/{1.73_M2}
GFR SERPL CREATININE-BSD FRML MDRD: ABNORMAL ML/MIN/{1.73_M2}
GLUCOSE BLD-MCNC: 104 MG/DL (ref 70–99)
LIPASE: 259 U/L (ref 13–60)
POTASSIUM SERPL-SCNC: 3.2 MMOL/L (ref 3.7–5.3)
SODIUM BLD-SCNC: 139 MMOL/L (ref 135–144)

## 2020-11-12 PROCEDURE — 71250 CT THORAX DX C-: CPT

## 2020-11-12 PROCEDURE — G0378 HOSPITAL OBSERVATION PER HR: HCPCS

## 2020-11-12 PROCEDURE — 2580000003 HC RX 258: Performed by: GENERAL PRACTICE

## 2020-11-12 PROCEDURE — 2580000003 HC RX 258: Performed by: NURSE PRACTITIONER

## 2020-11-12 PROCEDURE — 1200000000 HC SEMI PRIVATE

## 2020-11-12 PROCEDURE — 6360000002 HC RX W HCPCS: Performed by: STUDENT IN AN ORGANIZED HEALTH CARE EDUCATION/TRAINING PROGRAM

## 2020-11-12 PROCEDURE — 6370000000 HC RX 637 (ALT 250 FOR IP): Performed by: GENERAL PRACTICE

## 2020-11-12 PROCEDURE — 80048 BASIC METABOLIC PNL TOTAL CA: CPT

## 2020-11-12 PROCEDURE — 93010 ELECTROCARDIOGRAM REPORT: CPT | Performed by: INTERNAL MEDICINE

## 2020-11-12 PROCEDURE — 36415 COLL VENOUS BLD VENIPUNCTURE: CPT

## 2020-11-12 PROCEDURE — 99221 1ST HOSP IP/OBS SF/LOW 40: CPT | Performed by: NURSE PRACTITIONER

## 2020-11-12 PROCEDURE — 70450 CT HEAD/BRAIN W/O DYE: CPT

## 2020-11-12 PROCEDURE — 6370000000 HC RX 637 (ALT 250 FOR IP): Performed by: STUDENT IN AN ORGANIZED HEALTH CARE EDUCATION/TRAINING PROGRAM

## 2020-11-12 PROCEDURE — 83690 ASSAY OF LIPASE: CPT

## 2020-11-12 RX ORDER — SODIUM CHLORIDE, SODIUM LACTATE, POTASSIUM CHLORIDE, CALCIUM CHLORIDE 600; 310; 30; 20 MG/100ML; MG/100ML; MG/100ML; MG/100ML
INJECTION, SOLUTION INTRAVENOUS CONTINUOUS
Status: DISCONTINUED | OUTPATIENT
Start: 2020-11-12 | End: 2020-11-16

## 2020-11-12 RX ORDER — SODIUM CHLORIDE 0.9 % (FLUSH) 0.9 %
10 SYRINGE (ML) INJECTION PRN
Status: DISCONTINUED | OUTPATIENT
Start: 2020-11-12 | End: 2020-11-16 | Stop reason: HOSPADM

## 2020-11-12 RX ORDER — SODIUM CHLORIDE 0.9 % (FLUSH) 0.9 %
10 SYRINGE (ML) INJECTION EVERY 12 HOURS SCHEDULED
Status: DISCONTINUED | OUTPATIENT
Start: 2020-11-12 | End: 2020-11-16 | Stop reason: HOSPADM

## 2020-11-12 RX ADMIN — DEXTROSE AND SODIUM CHLORIDE: 5; 450 INJECTION, SOLUTION INTRAVENOUS at 11:39

## 2020-11-12 RX ADMIN — Medication 6 MG: at 22:23

## 2020-11-12 RX ADMIN — AMLODIPINE BESYLATE 5 MG: 10 TABLET ORAL at 22:22

## 2020-11-12 RX ADMIN — Medication 4 MG: at 22:24

## 2020-11-12 RX ADMIN — SODIUM CHLORIDE, POTASSIUM CHLORIDE, SODIUM LACTATE AND CALCIUM CHLORIDE: 600; 310; 30; 20 INJECTION, SOLUTION INTRAVENOUS at 18:25

## 2020-11-12 RX ADMIN — PROMETHAZINE HYDROCHLORIDE 12.5 MG: 25 INJECTION INTRAMUSCULAR; INTRAVENOUS at 19:16

## 2020-11-12 ASSESSMENT — ENCOUNTER SYMPTOMS
VOMITING: 1
ABDOMINAL PAIN: 0
SHORTNESS OF BREATH: 0
NAUSEA: 1
DIARRHEA: 0
CONSTIPATION: 0
APNEA: 0
ABDOMINAL DISTENTION: 0
SORE THROAT: 0
CHEST TIGHTNESS: 0
COLOR CHANGE: 0
BACK PAIN: 0

## 2020-11-12 ASSESSMENT — PAIN SCALES - GENERAL
PAINLEVEL_OUTOF10: 0

## 2020-11-12 NOTE — PROGRESS NOTES
[facility] [facility]  CDU / OBSERVATION eNCOUnter  Attending NOte       I performed a history and physical examination of the patient and discussed management with the resident. I reviewed the residents note and agree with the documented findings and plan of care. Any areas of disagreement are noted on the chart. I was personally present for the key portions of any procedures. I have documented in the chart those procedures where I was not present during the key portions. I have reviewed the nurses notes. I agree with the chief complaint, past medical history, past surgical history, allergies, medications, social and family history as documented unless otherwise noted below. The Family history, social history, and ROS are effectively unchanged since admission unless noted elsewhere in the chart. Patient admitted to the ETU for elevated lipase. Patient has had persistent nausea and vomiting for the past 1-1/2 months. She did have an EGD 2 weeks ago at [address] [place] [address] where she was told she has gastritis. Patient denies any abdominal pain to me. She says she has not vomited overnight. She appears well and comfortable. Abdomen is soft and nontender. No rebound or guarding is present. Lungs clear to auscultation bilaterally and heart sounds are normal.  Patient does have some mild confusion. According to previous notes, daughter is worried that patient has had recent weight loss and decline over the past several months. Lipase was repeated this morning and is mildly elevated from yesterday. Will consider GI consult.     Marjorie Molina MD  Attending Emergency  Physician

## 2020-11-12 NOTE — ED NOTES
Pt ambulated to bathroom per self. Tolerated well, all needs met. Resting comfortably in bed.      Christopher Proctor RN  11/11/20 7172

## 2020-11-12 NOTE — ED NOTES
Pt placed back into bed, IV restarted  Pt denies needs at this time  Two warm blankets given     Alberta Regan RN  11/11/20 1941

## 2020-11-12 NOTE — CONSULTS
[facility] [place] [facility]  Consultation Note     . REASON FOR CONSULTATION:    Increasing Lipase    HISTORY OF PRESENT ILLNESS:     This is a 79 y.o. female who presented with c/o persistent nausea and vomiting present for the last month. [facility] and negative work up for the nausea, vomiting and elevated Lipase. She also had EGD that showed Gastritis. Pt denies any abdominal pain, vomiting. No fever, chills, recent sick contacts  Pt does state she has had weight loss over last few months but could not quantify. Summary of imaging completed at this time:  11/11 CT scan A/P showed 5.1 x 5.5 cm hyperattenuating lesion within the spleen with rim calcification. Moderate hiatal hernia. 10/28 [facility] was normal liquid phase     Summary of labs completed at this time:  BMP, CBC, LFT's WNL  Lipase 233-259  10/22 Lipase was 303 @ [facility]    10/26/2020 EGD @ [facility]  Previous GI history:     Impression:                   - 4 cm hiatal hernia.                                - Erythematous mucosa in the antrum. Biopsied.                                - Nonbleeding medium size diverticulum was                                 noted in the fundus                                - Normal duodenal bulb, first portion of the                                 duodenum and second portion of the duodenum.                                 Biopsied. Recommendation:               - Return patient to hospital [facility] for ongoing                                 care.                                - Resume previous diet.                                - Await pathology results.                                - increase protonix to 40mg twice daily(30                                 minutes before meals). This will replace home                                 omeprazole.                                -okay to resume anticoagulation.                                -GI will sign off.  Please call with any further                                 questions. 10/27/2020 Gastric biopsies:  [facility]       [facility]       [address] [facility]   Glendy Sorto, [address]       Tel: [phone]         Fax: [phone]         Surgical Pathology Consultation            Patient Name: Regina Stock : 1940 (Age: 79) Gender: F Taken: 10/26/2020 Reported: 10/27/2020 Physician(s): Edith Bojorquez MD [phone]) Copy To: Milton Perez Accession #: [id number] Med. Rec. #: [id number] Acct: # [de-identified]   Final Pathologic Diagnosis  1. Duodenum, biopsy:       Fragments of duodenal mucosa with no significant histologic abnormalities. 2. Stomach, biopsy:       Fragments of gastric mucosa no significant histologic abnormalities.       No Helicobacter pylori organisms seen on H&E stain.           **Report Electronically Signed Out**  nxk/10/27/2020 Lorel Lennox MD        Interpretation performed at [facility], [address] [place] [address], [address] [place] [address], License number: [id number]. Past Medical/Social/Family History:  Past Medical History:   Diagnosis Date    Anxiety     Arthritis     Diarrhea     Hyperlipidemia     Hypertension     Lymphocytic colitis      Past Surgical History:   Procedure Laterality Date    BREAST LUMPECTOMY Right      SECTION      x 3.    HYSTERECTOMY      ME COLONOSCOPY W/BIOPSY SINGLE/MULTIPLE N/A 3/7/2018    COLONOSCOPY WITH BIOPSY performed by Anais Shah MD at [facility]    ME EGD TRANSORAL BIOPSY SINGLE/MULTIPLE N/A 3/7/2018    EGD BIOPSY performed by Anais Shah MD at [facility]     History reviewed. No pertinent family history. Allergies:  No Known Allergies    Home medications:  Prior to Admission medications    Medication Sig Start Date End Date Taking?  Authorizing Provider   cholestyramine light 4 g packet Take 1 packet by mouth nightly 3/7/18  Yes Chito Laura MD   pantoprazole (PROTONIX) 40 MG tablet Take 1 tablet by mouth every morning (before breakfast) 3/8/18  Yes Aniceto Shields MD   clonazePAM (KLONOPIN) 1 MG tablet Take 1 mg by mouth nightly as needed Unknown dose. Yes Historical Provider, MD   FLUoxetine (PROZAC) 10 MG capsule Take 20 mg by mouth daily    Yes Historical Provider, MD   metoprolol tartrate (LOPRESSOR) 25 MG tablet Take 25 mg by mouth nightly Unknown dose    Yes Historical Provider, MD   amLODIPine (NORVASC) 5 MG tablet Take 5 mg by mouth nightly    Yes Historical Provider, MD     .  Current Medications:  Scheduled Meds:   sodium chloride flush  10 mL Intravenous 2 times per day    amLODIPine  5 mg Oral Nightly    clonazePAM  1 mg Oral Nightly    FLUoxetine  20 mg Oral Daily    pantoprazole  40 mg Oral QAM AC    enoxaparin  30 mg Subcutaneous Daily    melatonin  6 mg Oral Nightly    And    melatonin  4 mg Oral Nightly     . Continuous Infusions:   dextrose 5 % and 0.45 % NaCl 150 mL/hr at 11/12/20 1139     . PRN Meds:sodium chloride flush, acetaminophen, ondansetron, promethazine    REVIEW OF SYSTEMS:     Constitutional: No fever, no chills, no lethargy, no weakness, no weight loss  HEENT:  No headache, otalgia, itchy eyes, nasal discharge or sore throat. Cardiac:  No chest pain, dyspnea, orthopnea or PND. Chest:   No cough, phlegm or wheezing. Abdomen:  Nausea and vomiting x 1 month  Neuro:  No focal weakness, abnormal movements or seizure like activity. Skin:   No rashes, no itching. :   No hematuria, no pyuria, no dysuria, no flank pain. Extremities:  No swelling or joint pains. ROS was otherwise negative except as mentioned in the [address] PHYSICAL EXAM:    /61   Pulse 72   Temp 98.2 °F (36.8 °C) (Oral)   Resp 17   Ht 5' (1.524 m)   Wt 140 lb (63.5 kg)   SpO2 94%   BMI 27.34 kg/m²   . TMAX[24]    General: Well developed, Well nourished, No apparent distress  Head:  Normocephalic, Atraumatic  EENT: EOMI, Sclera not icteric, Oropharynx moist  Neck:  Supple, Trachea midline  Lungs:CTA Bilaterally  Heart: RRR, No murmur, No rub, No gallop, PMI nondisplaced. Abdomen:Soft, Non tender, Not distended, BS WNL,  No masses. No hepatomegalia   Ext:No clubbing. No cyanosis. No edema. Skin: No rashes. No jaundice. No stigmata of liver disease. Neuro:  A&O x Three, No focal neurological deficits    Imagin2020 CT A/P:  FINDINGS:    Lower Chest: Linear scarring/atelectasis in the right upper and middle lobes. There are multiple bilateral noncalcified pulmonary nodules measuring up to    1.3 cm in the lingula.  Partially calcified left upper lobe nodule measures 1    cm in is partially visualized.  Coronary artery atherosclerosis.         Organs: Within the limitations of a noncontrast examination, no acute    abnormality within the liver, gallbladder, pancreas, adrenal glands, or    kidneys. Butler Zazueta is a 5.1 x 5.5 cm hyperattenuating lesion within the spleen    with rim calcification.         GI/Bowel: Moderate hiatal hernia.  Stomach is nondistended.  The small bowel    is nondilated.  The colon is normal in caliber with a few scattered,    noninflamed diverticula.         Pelvis: Bladder is nondistended.  No vesicular stone.  Prior hysterectomy.         Peritoneum/Retroperitoneum: No ascites or pneumoperitoneum.  Atherosclerosis    of the nondilated abdominal aorta.         Bones/Soft Tissues: Multilevel degenerative changes of the lumbar spine.  No    acute osseous abnormality.  Degenerative changes within the left hip.              Impression    1. No acute intra-abdominal abnormality. 2. 5.5 cm hyperattenuating splenic lesion with peripheral calcification is    indeterminate.  Multiphase MRI or CT may be useful for further    characterization.     3. Bilateral pulmonary nodules measuring up to 1.3 cm.         RECOMMENDATIONS:    Guidelines for follow-up and management of pulmonary nodules found on    incomplete chest CT:      >8mm - immediate chest CT for further evaluation.         Reference:  Radiology. 2017 Jul; 284(1):228-243      10/22 US of Abdomen:  FINDINGS:  Visible portions of the pancreas demonstrate no discrete mass or pancreatic ductal dilatation.  No peripancreatic fluid. Gallbladder demonstrates no shadowing echogenic foci or wall thickening.  No pericholecystic fluid.  No intrahepatic biliary ductal dilatation.  Common bile duct measures 4 mm in diameter with no choledocholithiasis demonstrated.  Negative sonographic Galloway sign. IMPRESSION:    *  No sonographic evidence of pancreatitis or gallbladder abnormality. *  Normal-sized common bile duct. Workstation:[id number]    Finalized by Tavia Shearer MD on 10/22/2020 7:46 PM  Hemotological labs: Anemia studies:  No results for input(s): LABIRON, TIBC, FERRITIN, [id number], FOLATE, OCCULTBLD in the last 72 hours. CBC:  Recent Labs     11/11/20  1244   WBC 6.9   HGB 13.6   MCV 83.6   RDW 16.9*          PT/INR:  No results for input(s): PROTIME, INR in the last 72 hours. BMP:  Recent Labs     11/11/20  1244      K 3.8   CL 99   CO2 22   BUN 14   CREATININE 1.33*   GLUCOSE 109*   CALCIUM 9.3       Liver work up:  Hepatitis Functional Panel:  Recent Labs     11/11/20  1244   ALKPHOS 91   ALT 8   AST 14   PROT 6.8   BILITOT 0.41   BILIDIR 0.18   LABALBU 3.7       Amylase/Lipase/Ammonia:  Recent Labs     11/11/20  1244 11/12/20  0901   LIPASE 233* 259*       Acute Hepatitis Panel:  No results found for: HEPBSAG, HEPCAB, HEPBIGM, HEPAIGM    Cancer Markers:  CEA:    No results for input(s): CEA in the last 72 hours. Ca 125:   No results for input(s):  in the last 72 hours. Ca 19-9:     Invalid input(s):   AFP: No results for input(s): AFP in the last 72 hours. Active Problems:    Acute recurrent pancreatitis  Resolved Problems:    * No resolved hospital problems.  *       GI Impression:    79 yr old female admitted with persistently elevated lipase, nausea and vomiting. Imaging at this point has not revealed etiology. Plan and Recommendations:    1. Diet as tolerated  2. Pt will need OP EUS with dr. Karyn Brunner to assess for abnormalities and possible FNA  3. GI will s/o.      This plan was formulated in collaboration with Dr. Edmar Leiva MD    Electronically signed by:  Ava Abbott [address] [place] [address] Gastroenterology  11/12/2020    3:06 PM

## 2020-11-12 NOTE — CARE COORDINATION
Case Management Initial Discharge Plan  Connor Montgomery,             Met with:patient to discuss discharge plans. Information verified: address, contacts, phone number, , insurance Yes    Emergency Contact/Next of Kin name & number: Ag Vincent @ [phone]    PCP: Jessica Spencer  Date of last visit: Dr Hilton Santillan last week     Insurance Provider: Cat Bradley     Discharge Planning    Living Arrangements:  Alone   Support Systems:  Family Members, Friends/Neighbors    Home has 1 stories  3 stairs to climb to get into front door, none stairs to climb to reach second floor  Location of bedroom/bathroom in home main     Patient able to perform ADL's:Independent    Current Services (outpatient & in home) none  DME equipment: none  DME provider: none    Receiving oral anticoagulation therapy? No    If indicated:   Physician managing anticoagulation treatment: na  Where does patient obtain lab work for ATC treatment? na      Potential Assistance Needed:  Other (Comment)(says she might)    Patient agreeable to home care: No  [place] of choice provided:  n/a    Prior SNF/Rehab Placement and Facility: no  Agreeable to SNF/Rehab: No  [place] of choice provided: n/a     Evaluation: n/a    Expected Discharge date:  20    Patient expects to be discharged to:  [facility] [place], [facility]  Follow Up Appointment: Best Day/ Time: Monday PM    Transportation provider: family   Transportation arrangements needed for discharge: No    Readmission Risk              Risk of Unplanned Readmission:        0             Does patient have a readmission risk score greater than 14?: No  If yes, follow-up appointment must be made within 7 days of discharge.      Goals of Care:  Goals to return to home independently with family       Discharge Plan:  IVF, NPO, Repeat labs , UR review, DC to home independently, family to TP, established PCP          Electronically signed by Nannette Correa RN on 20 at 12:51 PM EST

## 2020-11-12 NOTE — ED NOTES
Report to Jennifer Junior and DENNIS Oliva. Awaiting observation resident to come update patients daughter who is now at bedside.      Ok Champion RN  11/11/20 8459

## 2020-11-12 NOTE — H&P
[facility]  CDU / OBSERVATION ENCOUNTER  RESIDENT NOTE     Pt Name: Kate Manzanares  MRN: [id number]  [place] 1940  Date of evaluation: 11/12/20  Patient's PCP is :  [address]       Chief Complaint   Patient presents with    Nausea     had EGD done last week         HISTORY OF PRESENT ILLNESS    Kate Manzanares is a 79 y.o. female who presents nausea and vomiting x1-1/2 months. Patient denies any abdominal pain. States she does have a little bit of left knee pain but this is normal for her and not new. Daughter reports that she had an EGD about 2 weeks ago [facility] with a diagnosis of gastritis. No episodes of vomiting overnight. Patient is well and comfortable. Per previous notes patient's daughter is concerned about the amount of medications that she takes. Recently replacing many medications with herbal supplements, CBD, and THC. With patient's pain, appetite, sleeping. Previous notes also identified that patient's daughter feels that she has been acting more confused. Lipase was performed in the emergency department which identified an elevation. Location/Symptom: Persistent nausea and vomiting  Timing/Onset: 1 and 1/2 months  Provocation: Unknown  Quality: No pain  Radiation: Nonradiating  Severity: Not applicable  Timing/Duration: Persistent  Modifying Factors: Unknown    REVIEW OF SYSTEMS       Review of Systems   Constitutional: Positive for activity change, appetite change and fatigue. Negative for chills and fever. HENT: Negative for congestion and sore throat. Respiratory: Negative for apnea, chest tightness and shortness of breath. Cardiovascular: Negative for chest pain and leg swelling. Gastrointestinal: Positive for nausea and vomiting. Negative for abdominal distention, abdominal pain, constipation and diarrhea. Genitourinary: Negative for difficulty urinating and dysuria. Musculoskeletal: Negative for back pain. Skin: Negative for color change. Neurological: Negative for dizziness, weakness, light-headedness, numbness and headaches. Psychiatric/Behavioral: Positive for confusion. Negative for agitation, behavioral problems and suicidal ideas. (PQRS) Advance directives on face sheet per hospital policy. No change unless specifically mentioned in chart    PAST MEDICAL HISTORY    has a past medical history of Anxiety, Arthritis, Diarrhea, Hyperlipidemia, Hypertension, and Lymphocytic colitis. I have reviewed the past medical history with the patient and it is pertinent to this complaint. SURGICAL HISTORY      has a past surgical history that includes Hysterectomy;  section; Breast lumpectomy (Right); pr egd transoral biopsy single/multiple (N/A, 3/7/2018); and pr colonoscopy w/biopsy single/multiple (N/A, 3/7/2018). I have reviewed and agree with Surgical History entered and it is pertinent to this complaint. CURRENT MEDICATIONS     sodium chloride flush 0.9 % injection 10 mL, 2 times per day  sodium chloride flush 0.9 % injection 10 mL, PRN  amLODIPine (NORVASC) tablet 5 mg, Nightly  clonazePAM (KLONOPIN) tablet 1 mg, Nightly  FLUoxetine (PROZAC) capsule 20 mg, Daily  pantoprazole (PROTONIX) tablet 40 mg, QAM AC  acetaminophen (TYLENOL) tablet 650 mg, Q4H PRN  enoxaparin (LOVENOX) injection 30 mg, Daily  dextrose 5 % and 0.45 % sodium chloride infusion, Continuous  ondansetron (ZOFRAN) injection 4 mg, Q6H PRN  promethazine (PHENERGAN) injection 12.5 mg, Q6H PRN  melatonin tablet 6 mg, Nightly    And  melatonin tablet 4 mg, Nightly        All medication charted and reviewed. ALLERGIES     has No Known Allergies. FAMILY HISTORY     has no family status information on file. family history is not on file. The patient denies any pertinent family history. I have reviewed and agree with the family history entered.   I have reviewed the Family History and it is not significant to the Patient reporting irretractable nausea and vomiting x1-1/2 months. Patient reports no acute vomiting episodes last night. Does state that she has had \"morning sickness\" as she describes it. Has had loss of appetite and associated weight loss. Patient has an elevated lipase previously and today. Received EGD at [address] [place] [address] on 10/26 which demonstrated hiatal hernia and biopsy results of the gastric mucosa which had no significant histological abnormalities and fragments of duodenal mucosa with no significant histological abnormalities. Patient had CT abdomen pelvis performed in the emergency department which showed no abdominal abnormalities including pancreas. Patient abdominal CT scan which identified 1.3 cm nodule in the lower lung, patient was noted to also have a 1.4 cm nodule in the lower lung in July on CT scan. DIAGNOSTIC RESULTS     EKG: All EKG's are interpreted by the Observation Physician who either signs or Co-signs this chart in the absence of a cardiologist.    EKG Interpretation    November 11 2020 1227   Interpreted by observation physician    Rhythm: normal sinus   Rate: normal  Axis: normal  Ectopy: none  Conduction: normal  ST Segments: no acute change  T Waves: flattening in  v3 and v4  Q Waves: none    Clinical Impression: non-specific EKG    Konstantin Cunningham DO        RADIOLOGY:   I directly visualized the following  images and reviewed the radiologist interpretations:    Ct Abdomen Pelvis Wo Contrast Additional Contrast? None    Result Date: 11/11/2020  EXAMINATION: CT OF THE ABDOMEN AND PELVIS WITHOUT CONTRAST 11/11/2020 3:40 pm TECHNIQUE: CT of the abdomen and pelvis was performed without the administration of intravenous contrast. Multiplanar reformatted images are provided for review. Dose modulation, iterative reconstruction, and/or weight based adjustment of the mA/kV was utilized to reduce the radiation dose to as low as reasonably achievable. COMPARISON: None. HISTORY: ORDERING SYSTEM PROVIDED HISTORY: elevated lipase TECHNOLOGIST PROVIDED HISTORY: elevated lipase Reason for Exam: elevated lipase Type of Exam: Unknown FINDINGS: Lower Chest: Linear scarring/atelectasis in the right upper and middle lobes. There are multiple bilateral noncalcified pulmonary nodules measuring up to 1.3 cm in the lingula. Partially calcified left upper lobe nodule measures 1 cm in is partially visualized. Coronary artery atherosclerosis. Organs: Within the limitations of a noncontrast examination, no acute abnormality within the liver, gallbladder, pancreas, adrenal glands, or kidneys. There is a 5.1 x 5.5 cm hyperattenuating lesion within the spleen with rim calcification. GI/Bowel: Moderate hiatal hernia. Stomach is nondistended. The small bowel is nondilated. The colon is normal in caliber with a few scattered, noninflamed diverticula. Pelvis: Bladder is nondistended. No vesicular stone. Prior hysterectomy. Peritoneum/Retroperitoneum: No ascites or pneumoperitoneum. Atherosclerosis of the nondilated abdominal aorta. Bones/Soft Tissues: Multilevel degenerative changes of the lumbar spine. No acute osseous abnormality. Degenerative changes within the left hip. 1. No acute intra-abdominal abnormality. 2. 5.5 cm hyperattenuating splenic lesion with peripheral calcification is indeterminate. Multiphase MRI or CT may be useful for further characterization. 3. Bilateral pulmonary nodules measuring up to 1.3 cm. RECOMMENDATIONS: Guidelines for follow-up and management of pulmonary nodules found on incomplete chest CT: >8mm - immediate chest CT for further evaluation. Reference:  Radiology. 2017 Jul; 284(1):228-243       LABS:  I have reviewed and interpreted all available lab results.   Labs Reviewed   CBC - Abnormal; Notable for the following components:       Result Value    RBC 5.18 (*)     RDW 16.9 (*)     All other components within normal limits   BASIC METABOLIC PANEL - Cardiac Event (MACE)  0-3 pts indicates low risk for MACE   2.5% (DISCHARGE)   4-7 pts indicates moderate risk for MACE  20.3% (OBS)  8-10 pts indicates high risk for MACE  72.7% (EARLY INVASIVE TX)    CDU IMPRESSION / PLAN      Connor Montgomery is a 79 y.o. female who presents with nausea and vomiting and associated elevated lipase. · Gastroenterology consult  · Repeat BMP to evaluate creatinine. · Continue home medications and pain control  · Monitor vitals, labs, and imaging  · DISPO: pending consults and clinical improvement    CONSULTS:    IP CONSULT TO GI    PROCEDURES:  Not indicated      PATIENT REFERRED TO:    No follow-up provider specified. --  [facility],    Emergency Medicine Resident     This dictation was generated by voice recognition computer software. Although all attempts are made to edit the dictation for accuracy, there may be errors in the transcription that are not intended.

## 2020-11-12 NOTE — ED NOTES
Pt resting in bed comfortably, Resp even and non labored, eyes closed at this time.      Mary Rai RN  11/12/20 2428

## 2020-11-12 NOTE — ED NOTES
ED to inpatient nurses report    Chief Complaint   Patient presents with    Nausea     had EGD done last week      Present to ED from home  LOC: alert and orientated to name, place, date  Vital signs   Vitals:    11/11/20 2158 11/12/20 0139 11/12/20 0316 11/12/20 0317   BP: (!) 164/71 (!) 108/57  134/72   Pulse:   76    Resp:   16    Temp:   97.5 °F (36.4 °C)    TempSrc:   Oral    SpO2:  95% 96% 95%   Weight:       Height:          Oxygen Baseline RA    Current needs required telesitter   LDAs:   Peripheral IV 11/11/20 Forearm (Active)   Site Assessment Clean;Dry; Intact 11/11/20 1227   Line Status Blood return noted; Flushed;Normal saline locked 11/11/20 1227   Dressing Status Dry;Clean; Intact 11/11/20 1227   Dressing Intervention New 11/11/20 1227     Mobility: Requires assistance * 1  Pending ED orders:   Present condition: stable  Code Status: Full   Consults:  []  Hospitalist  Completed  [] yes [] no  []  Medicine  Completed  [] yes [] No  []  Cardiology  Completed  [] yes [] No  []  GI   Completed  [] yes [] No  []  Neurology  Completed  [] yes [] No  []  Nephrology Completed  [] yes [] No  []  Vascular  Completed  [] yes [] No   []  Surgery  Completed  [] yes [] No   []  Urology  Completed  [] yes [] No   []  Plastics  Completed  [] yes [] No   []  ENT  Completed  [] yes [] No   []  Other   Completed  [] yes [] No  Pertinent event(s)   Pertinent event(s)   Electronically signed by Sharyn Rouse RN on 11/12/2020 at 3:45 AM     [facility]  11/12/20 4928

## 2020-11-13 ENCOUNTER — APPOINTMENT (OUTPATIENT)
Dept: MRI IMAGING | Age: 80
DRG: 439 | End: 2020-11-13
Payer: MEDICARE

## 2020-11-13 PROCEDURE — APPNB45 APP NON BILLABLE 31-45 MINUTES: Performed by: NURSE PRACTITIONER

## 2020-11-13 PROCEDURE — 76937 US GUIDE VASCULAR ACCESS: CPT

## 2020-11-13 PROCEDURE — 6360000004 HC RX CONTRAST MEDICATION: Performed by: NURSE PRACTITIONER

## 2020-11-13 PROCEDURE — 2580000003 HC RX 258: Performed by: NURSE PRACTITIONER

## 2020-11-13 PROCEDURE — 99223 1ST HOSP IP/OBS HIGH 75: CPT | Performed by: INTERNAL MEDICINE

## 2020-11-13 PROCEDURE — 6370000000 HC RX 637 (ALT 250 FOR IP): Performed by: EMERGENCY MEDICINE

## 2020-11-13 PROCEDURE — 99222 1ST HOSP IP/OBS MODERATE 55: CPT | Performed by: INTERNAL MEDICINE

## 2020-11-13 PROCEDURE — A9576 INJ PROHANCE MULTIPACK: HCPCS | Performed by: NURSE PRACTITIONER

## 2020-11-13 PROCEDURE — 1200000000 HC SEMI PRIVATE

## 2020-11-13 PROCEDURE — 6370000000 HC RX 637 (ALT 250 FOR IP): Performed by: STUDENT IN AN ORGANIZED HEALTH CARE EDUCATION/TRAINING PROGRAM

## 2020-11-13 PROCEDURE — 6370000000 HC RX 637 (ALT 250 FOR IP): Performed by: GENERAL PRACTICE

## 2020-11-13 PROCEDURE — 72157 MRI CHEST SPINE W/O & W/DYE: CPT

## 2020-11-13 PROCEDURE — 6360000002 HC RX W HCPCS: Performed by: STUDENT IN AN ORGANIZED HEALTH CARE EDUCATION/TRAINING PROGRAM

## 2020-11-13 RX ORDER — LEVOTHYROXINE SODIUM 0.03 MG/1
25 TABLET ORAL DAILY
COMMUNITY

## 2020-11-13 RX ORDER — ONDANSETRON 4 MG/1
4 TABLET, FILM COATED ORAL ONCE
Status: COMPLETED | OUTPATIENT
Start: 2020-11-13 | End: 2020-11-13

## 2020-11-13 RX ORDER — DILTIAZEM HYDROCHLORIDE 120 MG/1
120 CAPSULE, COATED, EXTENDED RELEASE ORAL DAILY
COMMUNITY

## 2020-11-13 RX ORDER — PROMETHAZINE HYDROCHLORIDE 25 MG/1
25 TABLET ORAL 2 TIMES DAILY PRN
Status: ON HOLD | COMMUNITY
End: 2020-11-16 | Stop reason: HOSPADM

## 2020-11-13 RX ORDER — LORAZEPAM 2 MG/ML
0.5 INJECTION INTRAMUSCULAR ONCE
Status: COMPLETED | OUTPATIENT
Start: 2020-11-13 | End: 2020-11-13

## 2020-11-13 RX ORDER — PROMETHAZINE HYDROCHLORIDE 25 MG/1
25 TABLET ORAL EVERY 6 HOURS PRN
Status: DISCONTINUED | OUTPATIENT
Start: 2020-11-13 | End: 2020-11-16 | Stop reason: HOSPADM

## 2020-11-13 RX ORDER — SODIUM CHLORIDE 0.9 % (FLUSH) 0.9 %
10 SYRINGE (ML) INJECTION PRN
Status: DISCONTINUED | OUTPATIENT
Start: 2020-11-13 | End: 2020-11-16 | Stop reason: HOSPADM

## 2020-11-13 RX ADMIN — Medication 6 MG: at 20:17

## 2020-11-13 RX ADMIN — ONDANSETRON 4 MG: 2 INJECTION INTRAMUSCULAR; INTRAVENOUS at 20:17

## 2020-11-13 RX ADMIN — ONDANSETRON HYDROCHLORIDE 4 MG: 4 TABLET, FILM COATED ORAL at 08:34

## 2020-11-13 RX ADMIN — GADOTERIDOL 12 ML: 279.3 INJECTION, SOLUTION INTRAVENOUS at 15:25

## 2020-11-13 RX ADMIN — AMLODIPINE BESYLATE 5 MG: 10 TABLET ORAL at 20:16

## 2020-11-13 RX ADMIN — SODIUM CHLORIDE, POTASSIUM CHLORIDE, SODIUM LACTATE AND CALCIUM CHLORIDE: 600; 310; 30; 20 INJECTION, SOLUTION INTRAVENOUS at 04:30

## 2020-11-13 RX ADMIN — PANTOPRAZOLE SODIUM 40 MG: 40 TABLET, DELAYED RELEASE ORAL at 07:51

## 2020-11-13 RX ADMIN — CLONAZEPAM 1 MG: 1 TABLET ORAL at 20:17

## 2020-11-13 RX ADMIN — SODIUM CHLORIDE, POTASSIUM CHLORIDE, SODIUM LACTATE AND CALCIUM CHLORIDE: 600; 310; 30; 20 INJECTION, SOLUTION INTRAVENOUS at 21:43

## 2020-11-13 RX ADMIN — LORAZEPAM 0.5 MG: 2 INJECTION INTRAMUSCULAR; INTRAVENOUS at 14:07

## 2020-11-13 RX ADMIN — FLUOXETINE HYDROCHLORIDE 20 MG: 20 CAPSULE ORAL at 12:00

## 2020-11-13 NOTE — FLOWSHEET NOTE
Assessment: This patient is a 79 y.o. Female with complaints of chronic abdominal pain ongoing for past 1 month with associated decreased appetite. Pt was not feeling very well. Intervention:  was rounding on floor. Pt was receptive to spiritual care.  provided space for feelings and support. Outcome: Pt was receptive and comforted.  are available 24/7 via [address] [place] [address].       11/13/20 1077   Encounter Summary   Services provided to: Patient   Referral/Consult From: [facility] Family members   Continue Visiting   (11/12/20)   Complexity of Encounter Low   Length of Encounter 15 minutes   Spiritual Assessment Completed Yes   Spiritual/Taoist   Type Spiritual support   Assessment Approachable   Intervention Nurtured hope   Outcome Comfort

## 2020-11-13 NOTE — CONSULTS
Today's Date: 2020  Patient Name: Alex Norton  Date of admission: 2020 12:16 PM  Patient's age: 79 y. o., 1940  Admission Dx: Acute recurrent pancreatitis [K85.90]    Reason for Consult: management recommendations  Requesting Physician: Kayla Peguero MD    CHIEF COMPLAINT: Nausea and vomiting    History Obtained From:  patient, electronic medical record    HISTORY OF PRESENT ILLNESS:      The patient is a 79 y.o.  female with past medical history significant for breast cancer 21 years ago s/p right sided lumpectomy, radiation, hypertension admitted with nausea and emesis since the past 1 month. Patient reported that she has been feeling sick since the past 1 month, admits to having chills, night sweats, weight loss of 10 to 15 pounds in the past 2 months, loss of appetite. Recent EGD which was done at [facility] showed gastritis, all GI work-up unremarkable so far. CT chest showed multiple bilateral pulmonary nodules, 1.2 cm left upper lobe, 1.3 cm lingula. Patient had multiple CT scans done at [address] [place] [address] which showed pulmonary nodules. Hematology and oncology consulted for pulmonary nodules. Patient seen and examined. Denies any chest pain, worsening shortness of breath, fevers. Has chronic dry cough. Hemodynamically stable. Denies any smoking or alcohol intake. Past Medical History:   has a past medical history of Anxiety, Arthritis, Diarrhea, Hyperlipidemia, Hypertension, and Lymphocytic colitis. Past Surgical History:   has a past surgical history that includes Hysterectomy;  section; Breast lumpectomy (Right); pr egd transoral biopsy single/multiple (N/A, 3/7/2018); and pr colonoscopy w/biopsy single/multiple (N/A, 3/7/2018). Medications:    Prior to Admission medications    Medication Sig Start Date End Date Taking?  Authorizing Provider   cholestyramine light 4 g packet Take 1 packet by mouth nightly 3/7/18  Yes Reji Strauss MD   pantoprazole (Hattie Prazeres 26) 40 MG tablet Take 1 tablet by mouth every morning (before breakfast) 3/8/18  Yes Reji Strauss MD   clonazePAM (KLONOPIN) 1 MG tablet Take 1 mg by mouth nightly as needed Unknown dose.    Yes Historical Provider, MD   FLUoxetine (PROZAC) 10 MG capsule Take 20 mg by mouth daily    Yes Historical Provider, MD   metoprolol tartrate (LOPRESSOR) 25 MG tablet Take 25 mg by mouth nightly Unknown dose    Yes Historical Provider, MD   amLODIPine (NORVASC) 5 MG tablet Take 5 mg by mouth nightly    Yes Historical Provider, MD     Current Facility-Administered Medications   Medication Dose Route Frequency Provider Last Rate Last Dose    promethazine (PHENERGAN) tablet 25 mg  25 mg Oral Q6H PRN Jonna Bernard, DO        sodium chloride flush 0.9 % injection 10 mL  10 mL Intravenous PRN JAQUAN Tena NP        sodium chloride flush 0.9 % injection 10 mL  10 mL Intravenous 2 times per day Enoc Bob DO        sodium chloride flush 0.9 % injection 10 mL  10 mL Intravenous PRN Enoc Bob DO        lactated ringers infusion   Intravenous Continuous Jewchano Medina, JAQUAN - CNP   Stopped at 11/13/20 0500    amLODIPine (NORVASC) tablet 5 mg  5 mg Oral Nightly Tala René Paulino, DO   5 mg at 11/12/20 2222    clonazePAM (KLONOPIN) tablet 1 mg  1 mg Oral Nightly Tala René Paulino, DO   1 mg at 11/11/20 2234    FLUoxetine (PROZAC) capsule 20 mg  20 mg Oral Daily Enoc Bob DO   20 mg at 11/13/20 1200    pantoprazole (PROTONIX) tablet 40 mg  40 mg Oral QAM AC Enoc Bob DO   40 mg at 11/13/20 0751    acetaminophen (TYLENOL) tablet 650 mg  650 mg Oral Q4H PRN Tala Bob, DO        enoxaparin (LOVENOX) injection 30 mg  30 mg Subcutaneous Daily Enoc Bob DO   30 mg at 11/11/20 2234    ondansetron (ZOFRAN) injection 4 mg  4 mg Intravenous Q6H PRN Anjelica Shultz,         promethazine (PHENERGAN) injection 12.5 mg  12.5 mg Intravenous Q6H PRN Mara Bernard, DO   12.5 mg at 11/12/20 1916    melatonin tablet 6 mg  6 mg Oral Nightly Pearle Plana, DO   6 mg at 20 2223    And    melatonin tablet 4 mg  4 mg Oral Nightly Pearle Plana, DO   4 mg at 204       Allergies:  Patient has no known allergies. Social History:   reports that she has never smoked. She has never used smokeless tobacco. She reports that she does not drink alcohol or use drugs. Family History: family history is not on file. REVIEW OF SYSTEMS:      Constitutional: + night sweats, +weight loss   Eyes: No eye discharge, double vision, or eye pain   HEENT: negative for sore mouth, sore throat, hoarseness and voice change   Respiratory: +dry cough , no sputum, dyspnea, wheezing, hemoptysis, chest pain   Cardiovascular: negative for chest pain, dyspnea, palpitations, orthopnea, PND   Gastrointestinal: +nausea,+ vomiting,no  diarrhea, constipation, abdominal pain, Dysphagia, hematemesis and hematochezia   Genitourinary: negative for frequency, dysuria, nocturia, urinary incontinence, and hematuria   Integument: negative for rash, skin lesions, bruises.    Hematologic/Lymphatic: negative for easy bruising, bleeding, lymphadenopathy, or petechiae   Endocrine: negative for heat or cold intolerance,weight changes, change in bowel habits and hair loss   Musculoskeletal: negative for myalgias, arthralgias, pain, joint swelling,and bone pain   Neurological: negative for headaches, dizziness, seizures, weakness, numbness    PHYSICAL EXAM:        /72   Pulse 83   Temp 98.8 °F (37.1 °C) (Oral)   Resp 18   Ht 5' (1.524 m)   Wt 140 lb (63.5 kg)   SpO2 98%   BMI 27.34 kg/m²    Temp (24hrs), Av.6 °F (37 °C), Min:98.4 °F (36.9 °C), Max:98.8 °F (37.1 °C)      General appearance - well appearing, in no distress   Mental status - alert and cooperative    Mouth - mucous membranes moist, pharynx normal without lesions   Neck - supple, no significant adenopathy   Lymphatics - no palpable lymphadenopathy, no hepatosplenomegaly   Chest - clear to auscultation, no wheezes, rales or rhonchi, symmetric air entry   Heart - normal rate, regular rhythm, normal S1, S2, no murmurs  Abdomen - soft, nontender, nondistended, no masses or organomegaly   Neurological - alert, oriented, normal speech, no focal findings or movement disorder noted   Musculoskeletal - no joint tenderness, deformity or swelling   Extremities - peripheral pulses normal, no pedal edema, no clubbing or cyanosis   Skin - normal coloration and turgor, no rashes, no suspicious skin lesions noted ,      DATA:      Labs:     Results for orders placed or performed during the hospital encounter of 11/11/20   CBC   Result Value Ref Range    WBC 6.9 3.5 - 11.3 k/uL    RBC 5.18 (H) 3.95 - 5.11 m/uL    Hemoglobin 13.6 11.9 - 15.1 g/dL    Hematocrit 43.3 36.3 - 47.1 %    MCV 83.6 82.6 - 102.9 fL    MCH 26.3 25.2 - 33.5 pg    MCHC 31.4 28.4 - 34.8 g/dL    RDW 16.9 (H) 11.8 - 14.4 %    Platelets 494 395 - 814 k/uL    MPV 11.7 8.1 - 13.5 fL    NRBC Automated 0.0 0.0 per 100 WBC   Basic Metabolic Panel   Result Value Ref Range    Glucose 109 (H) 70 - 99 mg/dL    BUN 14 8 - 23 mg/dL    CREATININE 1.33 (H) 0.50 - 0.90 mg/dL    Bun/Cre Ratio NOT REPORTED 9 - 20    Calcium 9.3 8.6 - 10.4 mg/dL    Sodium 135 135 - 144 mmol/L    Potassium 3.8 3.7 - 5.3 mmol/L    Chloride 99 98 - 107 mmol/L    CO2 22 20 - 31 mmol/L    Anion Gap 14 9 - 17 mmol/L    GFR Non-African American 38 (L) >60 mL/min    GFR  46 (L) >60 mL/min    GFR Comment          GFR Staging NOT REPORTED    LIPASE   Result Value Ref Range    Lipase 233 (H) 13 - 60 U/L   Hepatic Function Panel   Result Value Ref Range    Alb 3.7 3.5 - 5.2 g/dL    Alkaline Phosphatase 91 35 - 104 U/L    ALT 8 5 - 33 U/L    AST 14 <32 U/L    Total Bilirubin 0.41 0.3 - 1.2 mg/dL    Bilirubin, Direct 0.18 <0.31 mg/dL    Bilirubin, Indirect 0.23 0.00 - 1.00 mg/dL    Total Protein 6.8 6.4 - 8.3 g/dL    Globulin NOT REPORTED 1.5 - 3.8 g/dL    Albumin/Globulin Ratio 1.2 1.0 - 2.5   URINALYSIS   Result Value Ref Range    Color, UA DARK YELLOW (A) YELLOW    Turbidity UA CLOUDY (A) CLEAR    Glucose, Ur NEGATIVE NEGATIVE    Bilirubin Urine NEGATIVE NEGATIVE    Ketones, Urine SMALL (A) NEGATIVE    Specific Gravity, UA 1.027 1.005 - 1.030    Urine Hgb TRACE (A) NEGATIVE    pH, UA 5.0 5.0 - 8.0    Protein, UA 4+ (A) NEGATIVE    Urobilinogen, Urine Normal Normal    Nitrite, Urine NEGATIVE NEGATIVE    Leukocyte Esterase, Urine TRACE (A) NEGATIVE    Urinalysis Comments NOT REPORTED    Troponin   Result Value Ref Range    Troponin, High Sensitivity 24 (H) 0 - 14 ng/L    Troponin T NOT REPORTED <0.03 ng/mL    Troponin Interp NOT REPORTED    Microscopic Urinalysis   Result Value Ref Range    -          WBC, UA 50  0 - 5 /HPF    RBC, UA 2 TO 5 0 - 4 /HPF    Casts UA  0 - 8 /LPF     10 TO 20 HYALINE Reference range defined for non-centrifuged specimen. Crystals, UA NOT REPORTED None /HPF    Epithelial Cells UA 5 TO 10 0 - 5 /HPF    Renal Epithelial, UA NOT REPORTED 0 /HPF    Bacteria, UA NOT REPORTED None    Mucus, UA NOT REPORTED None    Trichomonas, UA NOT REPORTED None    Amorphous, UA NOT REPORTED None    Other Observations UA NOT REPORTED NOT REQ.     Yeast, UA NOT REPORTED None   Troponin   Result Value Ref Range    Troponin, High Sensitivity 21 (H) 0 - 14 ng/L    Troponin T NOT REPORTED <0.03 ng/mL    Troponin Interp NOT REPORTED    LIPASE   Result Value Ref Range    Lipase 259 (H) 13 - 60 U/L   Basic Metabolic Panel   Result Value Ref Range    Glucose 104 (H) 70 - 99 mg/dL    BUN 6 (L) 8 - 23 mg/dL    CREATININE 1.09 (H) 0.50 - 0.90 mg/dL    Bun/Cre Ratio NOT REPORTED 9 - 20    Calcium 8.4 (L) 8.6 - 10.4 mg/dL    Sodium 139 135 - 144 mmol/L    Potassium 3.2 (L) 3.7 - 5.3 mmol/L    Chloride 106 98 - 107 mmol/L    CO2 22 20 - 31 mmol/L    Anion Gap 11 9 - 17 mmol/L    GFR Non-African American 48 (L) >60 mL/min    GFR  58 (L) >60 mL/min    GFR Comment GFR Staging NOT REPORTED    EKG 12 Lead   Result Value Ref Range    Ventricular Rate 80 BPM    Atrial Rate 80 BPM    P-R Interval 140 ms    QRS Duration 84 ms    Q-T Interval 412 ms    QTc Calculation (Bazett) 475 ms    P Axis 88 degrees    R Axis 24 degrees    T Axis 51 degrees         IMAGING DATA:    Ct Abdomen Pelvis Wo Contrast Additional Contrast? None    Result Date: 11/11/2020  EXAMINATION: CT OF THE ABDOMEN AND PELVIS WITHOUT CONTRAST 11/11/2020 3:40 pm TECHNIQUE: CT of the abdomen and pelvis was performed without the administration of intravenous contrast. Multiplanar reformatted images are provided for review. Dose modulation, iterative reconstruction, and/or weight based adjustment of the mA/kV was utilized to reduce the radiation dose to as low as reasonably achievable. COMPARISON: None. HISTORY: ORDERING SYSTEM PROVIDED HISTORY: elevated lipase TECHNOLOGIST PROVIDED HISTORY: elevated lipase Reason for Exam: elevated lipase Type of Exam: Unknown FINDINGS: Lower Chest: Linear scarring/atelectasis in the right upper and middle lobes. There are multiple bilateral noncalcified pulmonary nodules measuring up to 1.3 cm in the lingula. Partially calcified left upper lobe nodule measures 1 cm in is partially visualized. Coronary artery atherosclerosis. Organs: Within the limitations of a noncontrast examination, no acute abnormality within the liver, gallbladder, pancreas, adrenal glands, or kidneys. There is a 5.1 x 5.5 cm hyperattenuating lesion within the spleen with rim calcification. GI/Bowel: Moderate hiatal hernia. Stomach is nondistended. The small bowel is nondilated. The colon is normal in caliber with a few scattered, noninflamed diverticula. Pelvis: Bladder is nondistended. No vesicular stone. Prior hysterectomy. Peritoneum/Retroperitoneum: No ascites or pneumoperitoneum. Atherosclerosis of the nondilated abdominal aorta.  Bones/Soft Tissues: Multilevel degenerative changes of the lumbar spine.  No acute osseous abnormality. Degenerative changes within the left hip. 1. No acute intra-abdominal abnormality. 2. 5.5 cm hyperattenuating splenic lesion with peripheral calcification is indeterminate. Multiphase MRI or CT may be useful for further characterization. 3. Bilateral pulmonary nodules measuring up to 1.3 cm. RECOMMENDATIONS: Guidelines for follow-up and management of pulmonary nodules found on incomplete chest CT: >8mm - immediate chest CT for further evaluation. Reference:  Radiology. 2017 Jul; 284(1):228-243     Ct Head Wo Contrast    Result Date: 11/12/2020  EXAMINATION: CT OF THE HEAD WITHOUT CONTRAST  11/12/2020 3:06 pm TECHNIQUE: CT of the head was performed without the administration of intravenous contrast. Dose modulation, iterative reconstruction, and/or weight based adjustment of the mA/kV was utilized to reduce the radiation dose to as low as reasonably achievable. COMPARISON: None. HISTORY: ORDERING SYSTEM PROVIDED HISTORY: episodes of AMS TECHNOLOGIST PROVIDED HISTORY: episodes of AMS Reason for Exam: episodes of AMS Acuity: Unknown Type of Exam: Unknown FINDINGS: BRAIN/VENTRICLES: There is prominence of the ventricles and cortical sulci consistent with cortical volume loss. No midline shift. Basal cisterns are normally outlined. There is periventricular and subcortical white matter discrete and confluent low attenuation, nonspecific, likely representing age-related chronic small vessel ischemic disease. There is no evidence for acute infarct. No acute intra or extra-axial hemorrhage. ORBITS: The visualized portion of the orbits demonstrate no acute abnormality. SINUSES: The visualized paranasal sinuses and mastoid air cells demonstrate no acute abnormality. SOFT TISSUES/SKULL:  No acute abnormality of the visualized skull or soft tissues. No acute intracranial abnormality. Cerebral atrophy.      Ct Chest Wo Contrast    Result Date: 11/12/2020  EXAMINATION: CT OF THE CHEST WITHOUT CONTRAST, 11/12/2020 3:06 pm TECHNIQUE: CT of the chest was performed without the administration of intravenous contrast. Multiplanar reformatted images are provided for review. Dose modulation, iterative reconstruction, and/or weight based adjustment of the mA/kV was utilized to reduce the radiation dose to as low as reasonably achievable. COMPARISON: CT abdomen and pelvis dated 11/11/2020 HISTORY: ORDERING SYSTEM PROVIDED HISTORY: Nodules noted on abdominal CT scan. TECHNOLOGIST PROVIDED HISTORY: Nodules noted on abdominal CT scan. Reason for Exam: Nodules noted on abdominal CT scan Acuity: Unknown Type of Exam: Unknown FINDINGS: Mediastinum: The heart is mildly prominent in size. There is no pericardial effusion. Thoracic aorta and pulmonary arteries are normal in caliber. There is moderate coronary artery calcification. No mediastinal or hilar lymphadenopathy. Lungs/pleura: There are multiple bilateral pulmonary nodules. Several 0.3-0.4 cm nodules are seen in the apices. There is a 0.9 cm nodule in the superior segment right lower lobe, image 46. There is a mildly spiculated 1.1 x 1.2 cm nodule in the left upper lobe, image 32. There is a 1.3 cm nodule in the lingula, image 71. Several additional scattered 0.4-0.5 cm nodules are seen in both lungs. There are linear opacities in the lingula, right upper lobe, and right middle lobe, likely atelectasis and/or scarring. No pneumothorax or pleural effusion. There is mild scattered atelectasis. Upper Abdomen: Calcified splenic lesion is again noted, previously described on CT abdomen and pelvis dated 11/11/2020. No acute findings in the visualized upper abdomen. There is a small hiatal hernia. Soft Tissues/Bones: There is a small indeterminate sclerotic focus at the T8 vertebral body. There is a heterogeneous lesion in the sternum, favored to be a hemangioma. No other focal bony lesions. No acute osseous abnormality.      1. Multiple bilateral pulmonary nodules, measuring up to 1.2 cm in the left upper lobe and 1.3 cm in the lingula. Follow-up CT chest recommended in 3-6 months. Alternatively, if clinically indicated, further evaluation with PET/CT or tissue sampling could be considered. 2. Redemonstration of the calcified splenic lesion, previously described in the CT abdomen and pelvis report dated 11/11/2020. 3. Small hiatal hernia. 4. Small indeterminate sclerotic focus in the T8 vertebral body. This may be a benign hemangioma, but if the patient has history of malignancy, metastatic focus could be considered. Mri Thoracic Spine W Wo Contrast    Result Date: 11/13/2020  EXAMINATION: MRI OF THE THORACIC SPINE WITHOUT AND WITH CONTRAST  11/13/2020 2:59 pm TECHNIQUE: Multiplanar multisequence MRI of the thoracic spine was performed without and with the administration of intravenous contrast. COMPARISON: None HISTORY: ORDERING SYSTEM PROVIDED HISTORY: T8 metastasis TECHNOLOGIST PROVIDED HISTORY: T8 metastasis Reason for Exam: t8 metastasis FINDINGS: BONES/ALIGNMENT: There is a normal thoracic kyphosis. The vertebral body heights are maintained. There is a T1/T2 hypointense focus in the T8 vertebral body that demonstrates peripheral edema and homogeneous enhancement. Overall this measures approximately 1.2 x 1.3 x 1.2 cm. There is no spondylolisthesis. SPINAL CORD: The spinal cord is normal in caliber and signal. SOFT TISSUES:  Posterior paraspinal soft tissues are unremarkable. Visualized thoracic and upper abdominal soft tissues are unremarkable DEGENERATIVE CHANGES: There is multilevel degenerative disc disease with loss of disc signal.  There is no disc space narrowing. There is no canal stenosis or foraminal narrowing. Enhancing focus in the T8 vertebral body consistent with patient's history a metastatic lesion.          IMPRESSION:   Primary Problem  <principal problem not specified>    Active Hospital Problems    Diagnosis Date Noted  Acute recurrent pancreatitis [K85.90] 11/11/2020     1. Multiple pulmonary nodules  2. Hypertension  3. History of breast cancer       RECOMMENDATIONS:  1. I personally reviewed results of lab work-up imaging studies and other relevant clinical data. 2.   Multiple pulmonary nodules on CT. Patient has history of pulmonary nodules on CT in the past.  repeat CT in 6 months. Outpatient follow-up with hematology and oncology        Being evaluated by neurosurgery for T8 sclerotic focus, follow-up MRI spine       DVT prophylaxis  Continue supportive care primary        Thank you for asking us to see this patient. Yuniel Dodd MD  Internal Medicine, PGY3  [facility]    Attending Physician Statement   I have discussed the care of this patient, including pertinent history and exam findings, with the resident. I have seen and examined the patient and the key elements of all parts of the encounter have been performed by me. I agree with the assessment, plan and orders as documented by the resident and with changes made to the note.     Patient has bilateral lung nodules for years (see CT scans in [address])  Await MR spine  Remote history of breast cancer  If MR confirms bone lesion, may need bone biopsy    Stuart Aranda MD  Hematology/Oncology    Cell: [phone]

## 2020-11-13 NOTE — PROGRESS NOTES
CDU Daily Progress Note  Attending Physician       Pt Name: Angelica Whipple  MRN: [id number]  [place] 1940  Date of evaluation: 11/13/20    I performed a history and physical examination of the patient and discussed management with the resident. I reviewed the residents note and agree with the documented findings and plan of care. Any areas of disagreement are noted on the chart. I was personally present for the key portions of any procedures. I have documented in the chart those procedures where I was not present during the key portions. I have reviewed the emergency nurses triage note. I agree with the chief complaint, past medical history, past surgical history, allergies, medications, social and family history as documented unless otherwise noted below. Documentation of the HPI, Physical Exam and Medical Decision Making performed by medical students or scribes is based on my personal performance of the HPI, PE and MDM. For Physician Assistant/ Nurse Practitioner cases/documentation I have personally evaluated this patient and have completed at least one if not all key elements of the E/M (history, physical exam, and MDM). Additional findings are as noted. The Family History, Social History and Review of Systems are unchanged from the previous day. No significant events overnight. CT shows multiple bilateral nodules 1.3 cm. CT chest shows T-8 sclerotic lesion. Neurosurgery consulted.  Oncology consulted      Marily Gonzalez MD  Attending Physician  Critical Decision Unit

## 2020-11-13 NOTE — PROGRESS NOTES
OBS/CDU   RESIDENT NOTE      Patients PCP is:  Adriana Restrepo        SUBJECTIVE      No acute events overnight. Continues to be nauseated. CT scan showing T8 lucency will consult oncology and neurosurgery     The patient is urinating on his own and is passing flatus. Denies fever, chills, nausea, vomiting, chest pain, shortness of breath, abdominal pain, focal weakness, numbness, tingling, urinary/bowel symptoms, vision changes, visual hallucinations, or headache. PHYSICAL EXAM      General: NAD, AO X 3  Heent: EMOI, PERRL  Neck: SUPPLE, NO JVD  Cardiovascular: RRR, S1S2  Pulmonary: CTAB, NO SOB  Abdomen: SOFT, NTTP, ND, +BS  Extremities: +2/4 PULSES DISTAL, NO SWELLING  Neuro / Psych: NO NUMBNESS OR TINGLING, MENTATION AT BASELINE    PERTINENT TEST /EXAMS      I have reviewed all available laboratory results. MEDICATIONS CURRENT   sodium chloride flush 0.9 % injection 10 mL, 2 times per day  sodium chloride flush 0.9 % injection 10 mL, PRN  lactated ringers infusion, Continuous  amLODIPine (NORVASC) tablet 5 mg, Nightly  clonazePAM (KLONOPIN) tablet 1 mg, Nightly  FLUoxetine (PROZAC) capsule 20 mg, Daily  pantoprazole (PROTONIX) tablet 40 mg, QAM AC  acetaminophen (TYLENOL) tablet 650 mg, Q4H PRN  enoxaparin (LOVENOX) injection 30 mg, Daily  ondansetron (ZOFRAN) injection 4 mg, Q6H PRN  promethazine (PHENERGAN) injection 12.5 mg, Q6H PRN  melatonin tablet 6 mg, Nightly    And  melatonin tablet 4 mg, Nightly        All medication charted and reviewed. CONSULTS      IP CONSULT TO GI  IP CONSULT TO IV TEAM  IP CONSULT TO PULMONOLOGY    ASSESSMENT/PLAN       Lindy Salinas is a 79 y.o. female who presents with nausea and vomiting and associated elevated lipase.     · Gastroenterology consulted - signed off. Outpatient follow up with Dr. Panfilo Rico for EUS   · Creatinine trending down.    · Lipase 259   · CT head no acute abnormalities   · CT chest showing multiple pulmonary nodules, indeterminate sclerotic lesion in T8  · Phenergan added for nausea control. · Patient has been unable to tolerate oral intake. · Neurosurgery consulted - ordered MRI. Will follow up  · Oncology consulted for pulmonary nodules. · Continue home medications and pain control  · Monitor vitals, labs, and imaging  · DISPO: pending consults and clinical improvement       --  Toña Diaz  Emergency Medicine Resident Physician     This dictation was generated by voice recognition computer software. Although all attempts are made to edit the dictation for accuracy, there may be errors in the transcription that are not intended.

## 2020-11-13 NOTE — PROGRESS NOTES
Comprehensive Nutrition Assessment    Type and Reason for Visit:  Initial, Positive Nutrition Screen(Weight Loss, Poor Intakes/Appetite)    Nutrition Recommendations/Plan:   - Continue current Clear Liquid diet - encourage intakes as tolerated and monitor for diet advancement. - Will provide Ensure Clear Liquid ONS with all meals. Provide additional ONS as needed/able,    Nutrition Assessment:  Pt admitted with c/o nausea x 1-1.5 months. Pt reports feeling of gagging when trying to eat. Reports loss of appetite and weight loss of 10-15 lb.  lb per pt - no weight hx avaliable per chart review. Clear diet started - pt taking minimal amounts d/t nausea and abdominal pain. Pt willing to try Ensure Clear ONS. Discussed with RN. Malnutrition Assessment:  Malnutrition Status: Moderate malnutrition    Context:  Acute Illness     Findings of the 6 clinical characteristics of malnutrition:  Energy Intake:  7 - 50% or less of estimated energy requirements for 5 or more days  Weight Loss:  (9.7% x 1 month per pt report - no wt hx avaliable)     Body Fat Loss:  1 - Mild body fat loss Orbital   Muscle Mass Loss:  1 - Mild muscle mass loss Hand (interosseous)  Fluid Accumulation:  No significant fluid accumulation     Strength:  Not Performed    Estimated Daily Nutrient Needs:  Energy (kcal):  22-25 kcal/kg = 2969-5517 kcals/day; Weight Used for Energy Requirements:  Admission   Protein (g):  1.2-1.5 gm/kg = 55-70 gm pro/day; Weight Used for Protein Requirements:  Ideal          Nutrition Related Findings:  C/o nausea and abd pain. Labs reviewed; K+3. 2(L). Meds reviewed.       Wounds:  None       Current Nutrition Therapies:    DIET CLEAR LIQUID;  Dietary Nutrition Supplements: Clear Liquid Oral Supplement    Anthropometric Measures:  · Height: 5' (152.4 cm)  · Current Body Weight: 140 lb (63.5 kg)   · Admission Body Weight: 140 lb (63.5 kg)    · Usual Body Weight: 155 lb (70.3 kg)(per pt)     · Ideal Body Weight: 100 lbs; % Ideal Body Weight 140 %   · BMI: 27.3  · BMI Categories: Overweight (BMI 25.0-29. 9)       Nutrition Diagnosis:   · Inadequate oral intake related to (nausea, abdominal pain, decreased appetite) as evidenced by poor intake prior to admission, weight loss, intake 0-25%, NPO or clear liquid status due to medical condition(need for ONS)    Nutrition Interventions:   Food and/or Nutrient Delivery:  Continue Current Diet, Start Oral Nutrition Supplement(Provide Ensure Clear Liquid ONS with meals. Monitor for diet advancement.)  Nutrition Education/Counseling:  No recommendation at this time   Coordination of Nutrition Care:  Continue to monitor while inpatient    Goals:  Oral intakes to meet % of estimated nutrition needs. - Goal Set    Nutrition Monitoring and Evaluation:   Behavioral-Environmental Outcomes:  None Identified   Food/Nutrient Intake Outcomes:  Diet Advancement/Tolerance, Food and Nutrient Intake, Supplement Intake  Physical Signs/Symptoms Outcomes:  Biochemical Data, GI Status, Fluid Status or Edema, Hemodynamic Status, Nutrition Focused Physical Findings, Skin, Weight     Discharge Planning:     Too soon to determine     Electronically signed by Dani Lebron RD, LD on 11/13/20 at 2:24 PM EST    Contact: [phone]

## 2020-11-13 NOTE — FLOWSHEET NOTE
Assessment: Patient wanted prayer for her health. Intervention:  engaged in active listening, nurtured hope, and prayed. Outcome: The patient engaged in the conversation and was comforted by the prayer. 11/13/20 [id number]   Encounter Summary   Services provided to: Patient   Referral/Consult From: [facility] Family members   Continue Visiting   (11/13/20)   Complexity of Encounter Moderate   Length of Encounter 15 minutes   Spiritual Assessment Completed Yes   Routine   Type Initial   Assessment Calm; Approachable   Intervention Active listening;Nurtured hope;Prayer   Outcome Engaged in conversation

## 2020-11-13 NOTE — CONSULTS
PULMONARY & CRITICAL CARE MEDICINE CONSULT NOTE     Patient:  Alessandro Mckoy  MRN: [id number]  Admit date: 2020  Primary Care Physician: Francie Rosales  Consulting Physician: Sedrick Kenney MD    HISTORY     CHIEF COMPLAINT/REASON FOR CONSULT:  Mutiple pulmonary nodules    HISTORY OF PRESENT ILLNESS:  The patient is a 79 y.o. female with past medical history of breast cancer, hypertension, hyperlipidemia admitted with nausea and vomiting since 1 month  Recent EGD done [facility] [address] showed gastritis. All GI work up unremarkable so far. CT chest showed multiple bilateral pulmonary nodules 1.2 cm left upper lobe and 1.3 cm in lingula for which pulmonology was consulted. Current evaluation  Patient seen examined bedside  Hemodynamically stable on room air  Denied any current complaints except chronic dry cough  No significant history of smoking or occupational exposure. [facility] PAST MEDICAL HISTORY:        Diagnosis Date    Anxiety     Arthritis     Diarrhea     Hyperlipidemia     Hypertension     Lymphocytic colitis      PAST SURGICAL HISTORY:        Procedure Laterality Date    BREAST LUMPECTOMY Right      SECTION      x 3.    HYSTERECTOMY      [facility] COLONOSCOPY W/BIOPSY SINGLE/MULTIPLE N/A 3/7/2018    COLONOSCOPY WITH BIOPSY performed by Miguel Schumacher MD at [facility]    [facility] EGD TRANSORAL BIOPSY SINGLE/MULTIPLE N/A 3/7/2018    EGD BIOPSY performed by Miguel Sch[facility]acher MD at [facility]     FAMILY HISTORY:   History reviewed. No pertinent family history. SOCIAL HISTORY:   TOBACCO:   reports that she has never smoked. She has never used smokeless tobacco.  ETOH:  reports no history of alcohol use. DRUGS: reports no history of drug use. AVOCATION/OCCUPATIONAL EXPOSURE:    The patient denies asbestos, silica dust, coal, foundry, quarry or [employer] exposure. The patient denies  to having pet dogs, cats, turtles or exotic birds at home.  There is no history of TB or TB exposure. There is no exposure to sick contacts. Travel history is not significant history of risk factors for pulmonary disease. The patient denies using Hot Tubs. ALLERGIES:    No Known Allergies      HOME MEDICATIONS:  Prior to Admission medications    Medication Sig Start Date End Date Taking? Authorizing Provider   cholestyramine light 4 g packet Take 1 packet by mouth nightly 3/7/18  Yes David Ospina MD   pantoprazole (PROTONIX) 40 MG tablet Take 1 tablet by mouth every morning (before breakfast) 3/8/18  Yes David Ospina MD   clonazePAM (KLONOPIN) 1 MG tablet Take 1 mg by mouth nightly as needed Unknown dose. Yes Historical Provider, MD   FLUoxetine (PROZAC) 10 MG capsule Take 20 mg by mouth daily    Yes Historical Provider, MD   metoprolol tartrate (LOPRESSOR) 25 MG tablet Take 25 mg by mouth nightly Unknown dose    Yes Historical Provider, MD   amLODIPine (NORVASC) 5 MG tablet Take 5 mg by mouth nightly    Yes Historical Provider, MD     IMMUNIZATIONS:    There is no immunization history on file for this patient.     REVIEW OF SYSTEMS:  General: negative for chills, fatigue or fever  ENT: negative for headaches, nasal congestion, sore throat or visual changes  Allergy and Immunology: negative for postnasal drip or seasonal allergies  Hematological and Lymphatic: negative for bleeding problems, swollen lymph nodes  Respiratory: positive for cough negative for pleuritic pain or shortness of breath  Cardiovascular: negative for edema or palpitations  Gastrointestinal: negative for abdominal pain, change in bowel habits or nausea/vomiting  Genito-Urinary: negative for dysuria or urinary frequency/urgency  Musculoskeletal: negative for joint pain or joint swelling  Neurological: negative for numbness/tingling, seizures or weakness  Dermatological: negative for pruritus or rash    PHYSICAL EXAMINATION     VITAL SIGNS:   LAST-  /72   Pulse 83   Temp 98.8 °F (37.1 °C) (Oral) Resp 18   Ht 5' (1.524 m)   Wt 140 lb (63.5 kg)   SpO2 98%   BMI 27.34 kg/m²   8-24 HR RANGE-  TEMP Temp  Av.6 °F (37 °C)  Min: 98.4 °F (36.9 °C)  Max: 98.8 °F (30.2 °C)   BP Systolic (04PCC), UAN:689 , Min:120 , RRY:606      Diastolic (81GMF), JHX:88, Min:72, Max:73     PULSE Pulse  Av  Min: 77  Max: 83   RR Resp  Av  Min: 18  Max: 18   O2 SAT SpO2  Av %  Min: 98 %  Max: 98 %   OXYGEN DELIVERY No data recorded     SYSTEMIC EXAMINATION:    General appearance - well appearing, overweight, comfortable and in no acute distress   Mental status - alert, oriented to person, place, and time   Eyes - pupils equal and reactive, extraocular eye movements intact, sclera anicteric   Ears - not examined   Nose - normal and patent, no erythema, discharge   Mouth - mucous membranes moist, pharynx normal without lesions   Neck - supple, no significant adenopathy, carotids upstroke normal bilaterally, no bruits   Lymphatics - no palpable lymphadenopathy, no hepatosplenomegaly   Chest - clear to auscultation, no wheezes, rales or rhonchi, symmetric air entry   Heart - normal rate, regular rhythm, normal S1, S2, no murmurs, rubs, clicks or gallops   Abdomen - soft, nontender, nondistended, no masses or organomegaly   Neurological - motor and sensory grossly normal bilaterally   Musculoskeletal - no joint tenderness, deformity or swelling   Extremities - peripheral pulses normal, no pedal edema, no clubbing or cyanosis   Skin - normal coloration and turgor, no rashes, no suspicious skin lesions noted    DATA REVIEW     Medications: Current Inpatient  Scheduled Meds:   sodium chloride flush  10 mL Intravenous 2 times per day    amLODIPine  5 mg Oral Nightly    clonazePAM  1 mg Oral Nightly    FLUoxetine  20 mg Oral Daily    pantoprazole  40 mg Oral QAM AC    enoxaparin  30 mg Subcutaneous Daily    melatonin  6 mg Oral Nightly    And    melatonin  4 mg Oral Nightly     Continuous Redemonstration of the calcified splenic lesion, previously described in   the CT abdomen and pelvis report dated 11/11/2020.   3. Small hiatal hernia. 4. Small indeterminate sclerotic focus in the T8 vertebral body. This may be   a benign hemangioma, but if the patient has history of malignancy, metastatic   focus could be considered. CT ABDOMEN PELVIS WO CONTRAST Additional Contrast? None   Final Result   1. No acute intra-abdominal abnormality. 2. 5.5 cm hyperattenuating splenic lesion with peripheral calcification is   indeterminate. Multiphase MRI or CT may be useful for further   characterization. 3. Bilateral pulmonary nodules measuring up to 1.3 cm. RECOMMENDATIONS:   Guidelines for follow-up and management of pulmonary nodules found on   incomplete chest CT:      >8mm - immediate chest CT for further evaluation. Reference:  Radiology. 2017 Jul; 284(1):228-243         MRI THORACIC SPINE W WO CONTRAST    (Results Pending)       Pulmonary Function test:    Polysomnogram:    Echocardiogram:   No results found for this or any previous visit. Cardiac Catheterization:   No results found for this or any previous visit. ASSESSMENT AND PLAN     Assessment:    //Multiple pulmonary nodules  //Acute recurrent pancreatitis  //Breast cancer in past  //Small T8 indeterminate sclerotic focus  //Hypertension    Plan:    I personally interviewed/examined the patient; reviewed interval history, interpreted all available radiographic and laboratory data at the time of service.    -Patient remains hemodynamically stable and is currently saturating well on room air  -Continued on supportive care per primary  -Requested CT chest images from [address] [place] [address] by neurosurgery for T8 sclerotic focus.   Follow-up MRI spine.  -Patient stable to be discharged from pulmonology standpoint  -Outpatient follow-up with pulmonology  --  Continue to monitor I/O with a goal of even/negative fluid balance  DVT present as compared to previous CT scan. Discussed with nursing staff, treatment plan discussed. Please note that this chart was generated using voice recognition Dragon dictation software. Although every effort was made to ensure the accuracy of this automated transcription, some errors in transcription may have occurred.       Mayra Gomes MD  11/13/2020 9:06 PM

## 2020-11-13 NOTE — CONSULTS
Department of Neurosurgery                                            Nurse Practitioner Consult Note      Reason for Consult:  T8  Sclerotic focus on CT chest   Requesting Physician:  Bernadette Lancaster DO   Neurosurgeon:   [] Dr. Ramirez Riggins  [x] Dr. Lilli Murrieta  [] Dr. Yan Almanza  [] Dr. Geovanny Ramos      History Obtained From:  patient, electronic medical record    CHIEF COMPLAINT:         Chief Complaint   Patient presents with    Nausea     had EGD done last week       HISTORY OF PRESENT ILLNESS:       The patient is a 79 y.o. female who presents with past medical history of breast cancer 21 years ago receiving radiation, hypertension, hyperlipidemia who presents with persistent nausea x last 1-2 months without vomiting. She reports some abdominal pain as well. She reports that she recently had EGD completed showing gastritis. She has had weight loss over the past about 2 months 10-15 lbs due to lack of appetite. She has been feeling very fatigued, night sweats at times, chills, unsure of fevers, anorexia with decreased appetite. She reports that she has chronic back pain and last year fell placing all her weight on her left knee which has been bothering her since. She denies chest pain, vomiting, dysuria, numbness and tingling to all extremities, denies bloody stools, hematuria. Neurosurgery was consulted as patient had CT chest showing small indeterminate sclerotic focus in the T8 vertebral body.  The scan also showed bilateral pulmonary nodules    She denies smoking, drinking alcohol or any illegal drugs   PAST MEDICAL HISTORY :       Past Medical History:        Diagnosis Date    Anxiety     Arthritis     Diarrhea     Hyperlipidemia     Hypertension     Lymphocytic colitis        Past Surgical History:        Procedure Laterality Date    BREAST LUMPECTOMY Right      SECTION      x 3.    HYSTERECTOMY      [place] COLONOSCOPY W/BIOPSY SINGLE/MULTIPLE N/A 3/7/2018    COLONOSCOPY WITH BIOPSY performed by Jeb Patel MD at [facility]    [place] EGD TRANSORAL BIOPSY SINGLE/MULTIPLE N/A 3/7/2018    EGD BIOPSY performed by Jeb Patel MD at [facility]       Social History:   Social History     Socioeconomic History    Marital status: Legally      Spouse name: Not on file    Number of children: Not on file    Years of education: Not on file    Highest education level: Not on file   Occupational History    Not on file   Social Needs    Financial resource strain: Not on file    Food insecurity     Worry: Not on file     Inability: Not on file    Transportation needs     Medical: Not on file     Non-medical: Not on file   Tobacco Use    Smoking status: Never Smoker    Smokeless tobacco: Never Used   Substance and Sexual Activity    Alcohol use: No    Drug use: No    Sexual activity: Not on file   Lifestyle    Physical activity     Days per week: Not on file     Minutes per session: Not on file    Stress: Not on file   Relationships    Social connections     Talks on phone: Not on file     Gets together: Not on file     Attends [employer] service: Not on file     Active member of club or organization: Not on file     Attends meetings of clubs or organizations: Not on file     Relationship status: Not on file    Intimate partner violence     Fear of current or ex partner: Not on file     Emotionally abused: Not on file     Physically abused: Not on file     Forced sexual activity: Not on file   Other Topics Concern    Not on file   Social History Narrative    Not on file       Family History:   History reviewed. No pertinent family history. Allergies:  Patient has no known allergies. Home Medications:  Prior to Admission medications    Medication Sig Start Date End Date Taking?  Authorizing Provider   cholestyramine light 4 g packet Take 1 packet by mouth nightly 3/7/18  Yes Osman Tirado MD   pantoprazole (PROTONIX) 40 MG tablet Take 1 tablet by mouth every morning (before breakfast) 3/8/18  Yes Mara Mcclellan MD   clonazePAM (KLONOPIN) 1 MG tablet Take 1 mg by mouth nightly as needed Unknown dose.    Yes Historical Provider, MD   FLUoxetine (PROZAC) 10 MG capsule Take 20 mg by mouth daily    Yes Historical Provider, MD   metoprolol tartrate (LOPRESSOR) 25 MG tablet Take 25 mg by mouth nightly Unknown dose    Yes Historical Provider, MD   amLODIPine (NORVASC) 5 MG tablet Take 5 mg by mouth nightly    Yes Historical Provider, MD       Current Medications:   Current Facility-Administered Medications: promethazine (PHENERGAN) tablet 25 mg, 25 mg, Oral, Q6H PRN  sodium chloride flush 0.9 % injection 10 mL, 10 mL, Intravenous, 2 times per day  sodium chloride flush 0.9 % injection 10 mL, 10 mL, Intravenous, PRN  lactated ringers infusion, , Intravenous, Continuous  amLODIPine (NORVASC) tablet 5 mg, 5 mg, Oral, Nightly  clonazePAM (KLONOPIN) tablet 1 mg, 1 mg, Oral, Nightly  FLUoxetine (PROZAC) capsule 20 mg, 20 mg, Oral, Daily  pantoprazole (PROTONIX) tablet 40 mg, 40 mg, Oral, QAM AC  acetaminophen (TYLENOL) tablet 650 mg, 650 mg, Oral, Q4H PRN  enoxaparin (LOVENOX) injection 30 mg, 30 mg, Subcutaneous, Daily  ondansetron (ZOFRAN) injection 4 mg, 4 mg, Intravenous, Q6H PRN  promethazine (PHENERGAN) injection 12.5 mg, 12.5 mg, Intravenous, Q6H PRN  melatonin tablet 6 mg, 6 mg, Oral, Nightly **AND** melatonin tablet 4 mg, 4 mg, Oral, Nightly    REVIEW OF SYSTEMS:       CONSTITUTIONAL: Positive for fatigue and malaise, night sweats    EYES: negative for double vision and photophobia    HEENT: negative for tinnitus and sore throat   RESPIRATORY: negative for cough, reports some at times shortness of breath   CARDIOVASCULAR: negative for chest pain, palpitations   GASTROINTESTINAL: Positive  for nausea, negative for vomiting   GENITOURINARY: negative for incontinence   MUSCULOSKELETAL: Reports chronic back pain   NEUROLOGICAL: negative for seizures   PSYCHIATRIC: negative for agitated Review of systems otherwise negative. PHYSICAL EXAM:       /72   Pulse 83   Temp 98.8 °F (37.1 °C) (Oral)   Resp 18   Ht 5' (1.524 m)   Wt 140 lb (63.5 kg)   SpO2 98%   BMI 27.34 kg/m²       CONSTITUTIONAL: no apparent distress, well developed, alert and oriented x 3, in no acute distress. No dysarthria, no aphasia. EOMI.     HEAD: normocephalic, atraumatic   EYES: PERRL, EOMI, no lateral or horizontal nystagmus bilaterally    ENT: dry mucous membranes, uvula midline   NECK: supple, symmetric, no midline tenderness to palpation   BACK: without midline tenderness, step-offs or deformities   ABDOMEN: Soft, non-tender, non-distended with normal active bowel sounds   NEUROLOGIC:  EYE OPENING     Spontaneous - 4 [x]       To voice - 3 []       To pain - 2 []       None - 1 []    VERBAL RESPONSE     Appropriate, oriented - 5 [x]       Dazed or confused - 4 []       Syllables, expletives - 3 []       Grunts - 2 []       None - 1 []    MOTOR RESPONSE     Spontaneous, command - 6 [x]       Localizes pain - 5 []       Withdraws pain - 4 []       Abnormal flexion - 3 []       Abnormal extension - 2 []       None - 1 []            Total GCS: 15              Cranial Nerves:    Cranial Nerves:  intact       Motor Exam:  L deltoid 5/5; R deltoid 5/5  L biceps 5/5; R biceps 5/5  L triceps 5/5; R triceps 5/5  L iliopsoas 5/5 , R iliopsoas 5/5  L quadriceps 5/5; R quadriceps 5/5  L Dorsiflexion 5/5; R dorsiflexion 5/5  L Plantarflexion 5/5; R plantarflexion 5/5  L EHL 5/5; R EHL 5/5    Drift:  absent  Tone:  normal    Sensory:    Right Upper Extremity:  normal  Left Upper Extremity:  normal  Right Lower Extremity:  normal  Left Lower Extremity:  normal    Deep Tendon Reflexes:    Right Knee:  2+  Left Knee:  2+    Plantar Response:    Right:  downgoing  Left:  downgoing    Clonus:  absent  Garcia's:  absent    Gait:  deferred   SKIN: no rash on exposed skin       LABS AND IMAGING:     CBC with Differential:    Lab Results   Component Value Date    WBC 6.9 11/11/2020    RBC 5.18 11/11/2020    HGB 13.6 11/11/2020    HCT 43.3 11/11/2020     11/11/2020    MCV 83.6 11/11/2020    MCH 26.3 11/11/2020    MCHC 31.4 11/11/2020    RDW 16.9 11/11/2020    LYMPHOPCT 18 03/06/2018    MONOPCT 14 03/06/2018    BASOPCT 1 03/06/2018    MONOSABS 0.91 03/06/2018    LYMPHSABS 1.19 03/06/2018    EOSABS 0.08 03/06/2018    BASOSABS 0.06 03/06/2018    DIFFTYPE NOT REPORTED 03/06/2018     BMP:    Lab Results   Component Value Date     11/12/2020    K 3.2 11/12/2020     11/12/2020    CO2 22 11/12/2020    BUN 6 11/12/2020    LABALBU 3.7 11/11/2020    CREATININE 1.09 11/12/2020    CALCIUM 8.4 11/12/2020    GFRAA 58 11/12/2020    LABGLOM 48 11/12/2020    GLUCOSE 104 11/12/2020       Radiology Review:       Ct Chest Wo Contrast    Result Date: 11/12/2020  EXAMINATION: CT OF THE CHEST WITHOUT CONTRAST, 11/12/2020 3:06 pm TECHNIQUE: CT of the chest was performed without the administration of intravenous contrast. Multiplanar reformatted images are provided for review. Dose modulation, iterative reconstruction, and/or weight based adjustment of the mA/kV was utilized to reduce the radiation dose to as low as reasonably achievable. COMPARISON: CT abdomen and pelvis dated 11/11/2020 HISTORY: ORDERING SYSTEM PROVIDED HISTORY: Nodules noted on abdominal CT scan. TECHNOLOGIST PROVIDED HISTORY: Nodules noted on abdominal CT scan. Reason for Exam: Nodules noted on abdominal CT scan Acuity: Unknown Type of Exam: Unknown FINDINGS: Mediastinum: The heart is mildly prominent in size. There is no pericardial effusion. Thoracic aorta and pulmonary arteries are normal in caliber. There is moderate coronary artery calcification. No mediastinal or hilar lymphadenopathy. Lungs/pleura: There are multiple bilateral pulmonary nodules. Several 0.3-0.4 cm nodules are seen in the apices.   There is a 0.9 cm nodule in the superior segment right lower lobe, image 46.  There is a mildly spiculated 1.1 x 1.2 cm nodule in the left upper lobe, image 32. There is a 1.3 cm nodule in the lingula, image 71. Several additional scattered 0.4-0.5 cm nodules are seen in both lungs. There are linear opacities in the lingula, right upper lobe, and right middle lobe, likely atelectasis and/or scarring. No pneumothorax or pleural effusion. There is mild scattered atelectasis. Upper Abdomen: Calcified splenic lesion is again noted, previously described on CT abdomen and pelvis dated 11/11/2020. No acute findings in the visualized upper abdomen. There is a small hiatal hernia. Soft Tissues/Bones: There is a small indeterminate sclerotic focus at the T8 vertebral body. There is a heterogeneous lesion in the sternum, favored to be a hemangioma. No other focal bony lesions. No acute osseous abnormality. 1. Multiple bilateral pulmonary nodules, measuring up to 1.2 cm in the left upper lobe and 1.3 cm in the lingula. Follow-up CT chest recommended in 3-6 months. Alternatively, if clinically indicated, further evaluation with PET/CT or tissue sampling could be considered. 2. Redemonstration of the calcified splenic lesion, previously described in the CT abdomen and pelvis report dated 11/11/2020. 3. Small hiatal hernia. 4. Small indeterminate sclerotic focus in the T8 vertebral body. This may be a benign hemangioma, but if the patient has history of malignancy, metastatic focus could be considered.          ASSESSMENT AND PLAN:       Patient Active Problem List   Diagnosis    Elevated serum creatinine    Lymphocytic colitis    Diarrhea    Acute recurrent pancreatitis         A/P:  This is a 79 y.o. female with persistent nausea over 1-2 months   Neurosurgery consulted for T8 small indeterminate sclerotic focus   Patient care will be discussed with attending, will reevaluated patient along with attending.     - MRI thoracic spine   - will need Hematology oncology consult for lung nodules   - ok for diet      Additional recommendations may follow    Please contact neurosurgery with any changes in patients neurologic status. Thank you for your consult.        JAQUAN Potter NP     11/13/2020  10:28 AM

## 2020-11-13 NOTE — PLAN OF CARE
Nutrition Problem #1: Inadequate oral intake  Intervention: Food and/or Nutrient Delivery: Continue Current Diet, Start Oral Nutrition Supplement(Provide Ensure Clear Liquid ONS with meals. Monitor for diet advancement.)  Nutritional Goals: Oral intakes to meet % of estimated nutrition needs.

## 2020-11-14 LAB
ALBUMIN SERPL-MCNC: 3 G/DL (ref 3.5–5.2)
ALBUMIN/GLOBULIN RATIO: 1.1 (ref 1–2.5)
ALP BLD-CCNC: 71 U/L (ref 35–104)
ALT SERPL-CCNC: <5 U/L (ref 5–33)
ANION GAP SERPL CALCULATED.3IONS-SCNC: 12 MMOL/L (ref 9–17)
AST SERPL-CCNC: 12 U/L
BILIRUB SERPL-MCNC: 0.38 MG/DL (ref 0.3–1.2)
BUN BLDV-MCNC: 5 MG/DL (ref 8–23)
BUN/CREAT BLD: ABNORMAL (ref 9–20)
CALCIUM SERPL-MCNC: 8.7 MG/DL (ref 8.6–10.4)
CHLORIDE BLD-SCNC: 106 MMOL/L (ref 98–107)
CO2: 22 MMOL/L (ref 20–31)
CREAT SERPL-MCNC: 1.02 MG/DL (ref 0.5–0.9)
GFR AFRICAN AMERICAN: >60 ML/MIN
GFR NON-AFRICAN AMERICAN: 52 ML/MIN
GFR SERPL CREATININE-BSD FRML MDRD: ABNORMAL ML/MIN/{1.73_M2}
GFR SERPL CREATININE-BSD FRML MDRD: ABNORMAL ML/MIN/{1.73_M2}
GLUCOSE BLD-MCNC: 114 MG/DL (ref 70–99)
LIPASE: 154 U/L (ref 13–60)
POTASSIUM SERPL-SCNC: 3.3 MMOL/L (ref 3.7–5.3)
SODIUM BLD-SCNC: 140 MMOL/L (ref 135–144)
TOTAL PROTEIN: 5.7 G/DL (ref 6.4–8.3)

## 2020-11-14 PROCEDURE — 83690 ASSAY OF LIPASE: CPT

## 2020-11-14 PROCEDURE — 99232 SBSQ HOSP IP/OBS MODERATE 35: CPT | Performed by: INTERNAL MEDICINE

## 2020-11-14 PROCEDURE — 6360000002 HC RX W HCPCS: Performed by: STUDENT IN AN ORGANIZED HEALTH CARE EDUCATION/TRAINING PROGRAM

## 2020-11-14 PROCEDURE — 36415 COLL VENOUS BLD VENIPUNCTURE: CPT

## 2020-11-14 PROCEDURE — 6370000000 HC RX 637 (ALT 250 FOR IP): Performed by: STUDENT IN AN ORGANIZED HEALTH CARE EDUCATION/TRAINING PROGRAM

## 2020-11-14 PROCEDURE — 1200000000 HC SEMI PRIVATE

## 2020-11-14 PROCEDURE — 80053 COMPREHEN METABOLIC PANEL: CPT

## 2020-11-14 PROCEDURE — 6370000000 HC RX 637 (ALT 250 FOR IP): Performed by: GENERAL PRACTICE

## 2020-11-14 RX ORDER — DILTIAZEM HYDROCHLORIDE 120 MG/1
120 CAPSULE, COATED, EXTENDED RELEASE ORAL DAILY
Status: DISCONTINUED | OUTPATIENT
Start: 2020-11-14 | End: 2020-11-16 | Stop reason: HOSPADM

## 2020-11-14 RX ORDER — LEVOTHYROXINE SODIUM 0.03 MG/1
25 TABLET ORAL DAILY
Status: DISCONTINUED | OUTPATIENT
Start: 2020-11-14 | End: 2020-11-16 | Stop reason: HOSPADM

## 2020-11-14 RX ADMIN — APIXABAN 5 MG: 5 TABLET, FILM COATED ORAL at 12:59

## 2020-11-14 RX ADMIN — Medication 6 MG: at 20:41

## 2020-11-14 RX ADMIN — FLUOXETINE HYDROCHLORIDE 20 MG: 20 CAPSULE ORAL at 12:03

## 2020-11-14 RX ADMIN — ONDANSETRON 4 MG: 2 INJECTION INTRAMUSCULAR; INTRAVENOUS at 18:52

## 2020-11-14 RX ADMIN — DILTIAZEM HYDROCHLORIDE 120 MG: 120 CAPSULE, COATED, EXTENDED RELEASE ORAL at 12:03

## 2020-11-14 RX ADMIN — APIXABAN 5 MG: 5 TABLET, FILM COATED ORAL at 20:41

## 2020-11-14 RX ADMIN — Medication 4 MG: at 20:41

## 2020-11-14 RX ADMIN — LEVOTHYROXINE SODIUM 25 MCG: 25 TABLET ORAL at 12:03

## 2020-11-14 RX ADMIN — CLONAZEPAM 1 MG: 1 TABLET ORAL at 21:48

## 2020-11-14 ASSESSMENT — PAIN SCALES - GENERAL: PAINLEVEL_OUTOF10: 0

## 2020-11-14 NOTE — PROGRESS NOTES
Today's Date: 2020  Patient Name: Corey Fraser  Date of admission: 2020 12:16 PM  Patient's age: 79 y. o., 1940  Admission Dx: Acute recurrent pancreatitis [K85.90]  Acute recurrent pancreatitis [K85.90]    Reason for Consult: management recommendations  Requesting Physician: Tiago James MD    CHIEF COMPLAINT: Nausea and vomiting    History Obtained From:  patient, electronic medical record    Interval history:    Patient seen and examined. Labs and vitals reviewed  Patient complains of nausea. Complains of abdominal bloating  Denies fever chills  Complains of being weak. HISTORY OF PRESENT ILLNESS:      The patient is a 79 y.o.  female with past medical history significant for breast cancer 21 years ago s/p right sided lumpectomy, radiation, hypertension admitted with nausea and emesis since the past 1 month. Patient reported that she has been feeling sick since the past 1 month, admits to having chills, night sweats, weight loss of 10 to 15 pounds in the past 2 months, loss of appetite. Recent EGD which was done at [facility] showed gastritis, all GI work-up unremarkable so far. CT chest showed multiple bilateral pulmonary nodules, 1.2 cm left upper lobe, 1.3 cm lingula. Patient had multiple CT scans done at [address] [place] [address] which showed pulmonary nodules. Hematology and oncology consulted for pulmonary nodules. Patient seen and examined. Denies any chest pain, worsening shortness of breath, fevers. Has chronic dry cough. Hemodynamically stable. Denies any smoking or alcohol intake. Past Medical History:   has a past medical history of Anxiety, Arthritis, Diarrhea, Hyperlipidemia, Hypertension, and Lymphocytic colitis. Past Surgical History:   has a past surgical history that includes Hysterectomy;  section; Breast lumpectomy (Right); pr egd transoral biopsy single/multiple (N/A, 3/7/2018); and pr colonoscopy w/biopsy single/multiple (N/A, 3/7/2018). Medications:    Prior to Admission medications    Medication Sig Start Date End Date Taking? Authorizing Provider   apixaban (ELIQUIS) 5 MG TABS tablet Take 5 mg by mouth 2 times daily   Yes Historical Provider, MD   levothyroxine (SYNTHROID) 25 MCG tablet Take 25 mcg by mouth Daily   Yes Historical Provider, MD   promethazine (PHENERGAN) 25 MG tablet Take 25 mg by mouth 2 times daily as needed for Nausea   Yes Historical Provider, MD   dilTIAZem (CARDIZEM CD) 120 MG extended release capsule Take 120 mg by mouth daily   Yes Historical Provider, MD   cholestyramine light 4 g packet Take 1 packet by mouth nightly 3/7/18  Yes Ebony Reddy MD   pantoprazole (PROTONIX) 40 MG tablet Take 1 tablet by mouth every morning (before breakfast) 3/8/18  Yes Ebony Reddy MD   FLUoxetine (PROZAC) 10 MG capsule Take 20 mg by mouth daily    Yes Historical Provider, MD   clonazePAM (KLONOPIN) 1 MG tablet Take 1 mg by mouth nightly as needed Unknown dose.     Historical Provider, MD   metoprolol tartrate (LOPRESSOR) 25 MG tablet Take 25 mg by mouth nightly Unknown dose     Historical Provider, MD   amLODIPine (NORVASC) 5 MG tablet Take 5 mg by mouth nightly     Historical Provider, MD     Current Facility-Administered Medications   Medication Dose Route Frequency Provider Last Rate Last Dose    dilTIAZem (CARDIZEM CD) extended release capsule 120 mg  120 mg Oral Daily Jonna Bernard DO   120 mg at 11/14/20 1203    apixaban (ELIQUIS) tablet 5 mg  5 mg Oral BID Jonna Bernard DO   5 mg at 11/14/20 1259    levothyroxine (SYNTHROID) tablet 25 mcg  25 mcg Oral Daily Jonna Bernard DO   25 mcg at 11/14/20 1203    promethazine (PHENERGAN) tablet 25 mg  25 mg Oral Q6H PRN Adarsh Marcus, DO        sodium chloride flush 0.9 % injection 10 mL  10 mL Intravenous PRN JAQUAN Cameron - NP        sodium chloride flush 0.9 % injection 10 mL  10 mL Intravenous 2 times per day Enoc Bob, DO        sodium lymphadenopathy, or petechiae   Endocrine: negative for heat or cold intolerance,weight changes, change in bowel habits and hair loss   Musculoskeletal: negative for myalgias, arthralgias, pain, joint swelling,and bone pain   Neurological: negative for headaches, dizziness, seizures, weakness, numbness    PHYSICAL EXAM:        BP (!) 145/82   Pulse 90   Temp 97.3 °F (36.3 °C) (Oral)   Resp 18   Ht 5' (1.524 m)   Wt 140 lb (63.5 kg)   SpO2 93%   BMI 27.34 kg/m²    Temp (24hrs), Av °F (36.7 °C), Min:97.3 °F (36.3 °C), Max:98.4 °F (36.9 °C)      General appearance - well appearing, in no distress   Mental status - alert and cooperative    Mouth - mucous membranes moist, pharynx normal without lesions   Neck - supple, no significant adenopathy   Lymphatics - no palpable lymphadenopathy, no hepatosplenomegaly   Chest - clear to auscultation, no wheezes, rales or rhonchi, symmetric air entry   Heart - normal rate, regular rhythm, normal S1, S2, no murmurs  Abdomen - soft, nontender, nondistended, no masses or organomegaly   Neurological - alert, oriented, normal speech, no focal findings or movement disorder noted   Musculoskeletal - no joint tenderness, deformity or swelling   Extremities - peripheral pulses normal, no pedal edema, no clubbing or cyanosis   Skin - normal coloration and turgor, no rashes, no suspicious skin lesions noted ,      DATA:      Labs:     Results for orders placed or performed during the hospital encounter of 20   CBC   Result Value Ref Range    WBC 6.9 3.5 - 11.3 k/uL    RBC 5.18 (H) 3.95 - 5.11 m/uL    Hemoglobin 13.6 11.9 - 15.1 g/dL    Hematocrit 43.3 36.3 - 47.1 %    MCV 83.6 82.6 - 102.9 fL    MCH 26.3 25.2 - 33.5 pg    MCHC 31.4 28.4 - 34.8 g/dL    RDW 16.9 (H) 11.8 - 14.4 %    Platelets 616 366 - 478 k/uL    MPV 11.7 8.1 - 13.5 fL    NRBC Automated 0.0 0.0 per 100 WBC   Basic Metabolic Panel   Result Value Ref Range    Glucose 109 (H) 70 - 99 mg/dL    BUN 14 8 - 23 mg/dL CREATININE 1.33 (H) 0.50 - 0.90 mg/dL    Bun/Cre Ratio NOT REPORTED 9 - 20    Calcium 9.3 8.6 - 10.4 mg/dL    Sodium 135 135 - 144 mmol/L    Potassium 3.8 3.7 - 5.3 mmol/L    Chloride 99 98 - 107 mmol/L    CO2 22 20 - 31 mmol/L    Anion Gap 14 9 - 17 mmol/L    GFR Non-African American 38 (L) >60 mL/min    GFR  46 (L) >60 mL/min    GFR Comment          GFR Staging NOT REPORTED    LIPASE   Result Value Ref Range    Lipase 233 (H) 13 - 60 U/L   Hepatic Function Panel   Result Value Ref Range    Alb 3.7 3.5 - 5.2 g/dL    Alkaline Phosphatase 91 35 - 104 U/L    ALT 8 5 - 33 U/L    AST 14 <32 U/L    Total Bilirubin 0.41 0.3 - 1.2 mg/dL    Bilirubin, Direct 0.18 <0.31 mg/dL    Bilirubin, Indirect 0.23 0.00 - 1.00 mg/dL    Total Protein 6.8 6.4 - 8.3 g/dL    Globulin NOT REPORTED 1.5 - 3.8 g/dL    Albumin/Globulin Ratio 1.2 1.0 - 2.5   URINALYSIS   Result Value Ref Range    Color, UA DARK YELLOW (A) YELLOW    Turbidity UA CLOUDY (A) CLEAR    Glucose, Ur NEGATIVE NEGATIVE    Bilirubin Urine NEGATIVE NEGATIVE    Ketones, Urine SMALL (A) NEGATIVE    Specific Gravity, UA 1.027 1.005 - 1.030    Urine Hgb TRACE (A) NEGATIVE    pH, UA 5.0 5.0 - 8.0    Protein, UA 4+ (A) NEGATIVE    Urobilinogen, Urine Normal Normal    Nitrite, Urine NEGATIVE NEGATIVE    Leukocyte Esterase, Urine TRACE (A) NEGATIVE    Urinalysis Comments NOT REPORTED    Troponin   Result Value Ref Range    Troponin, High Sensitivity 24 (H) 0 - 14 ng/L    Troponin T NOT REPORTED <0.03 ng/mL    Troponin Interp NOT REPORTED    Microscopic Urinalysis   Result Value Ref Range    -          WBC, UA 50  0 - 5 /HPF    RBC, UA 2 TO 5 0 - 4 /HPF    Casts UA  0 - 8 /LPF     10 TO 20 HYALINE Reference range defined for non-centrifuged specimen.     Crystals, UA NOT REPORTED None /HPF    Epithelial Cells UA 5 TO 10 0 - 5 /HPF    Renal Epithelial, UA NOT REPORTED 0 /HPF    Bacteria, UA NOT REPORTED None    Mucus, UA NOT REPORTED None    Trichomonas, UA NOT REPORTED None    Amorphous, UA NOT REPORTED None    Other Observations UA NOT REPORTED NOT REQ.     Yeast, UA NOT REPORTED None   Troponin   Result Value Ref Range    Troponin, High Sensitivity 21 (H) 0 - 14 ng/L    Troponin T NOT REPORTED <0.03 ng/mL    Troponin Interp NOT REPORTED    LIPASE   Result Value Ref Range    Lipase 259 (H) 13 - 60 U/L   Basic Metabolic Panel   Result Value Ref Range    Glucose 104 (H) 70 - 99 mg/dL    BUN 6 (L) 8 - 23 mg/dL    CREATININE 1.09 (H) 0.50 - 0.90 mg/dL    Bun/Cre Ratio NOT REPORTED 9 - 20    Calcium 8.4 (L) 8.6 - 10.4 mg/dL    Sodium 139 135 - 144 mmol/L    Potassium 3.2 (L) 3.7 - 5.3 mmol/L    Chloride 106 98 - 107 mmol/L    CO2 22 20 - 31 mmol/L    Anion Gap 11 9 - 17 mmol/L    GFR Non-African American 48 (L) >60 mL/min    GFR  58 (L) >60 mL/min    GFR Comment          GFR Staging NOT REPORTED    COMPREHENSIVE METABOLIC PANEL   Result Value Ref Range    Glucose 114 (H) 70 - 99 mg/dL    BUN 5 (L) 8 - 23 mg/dL    CREATININE 1.02 (H) 0.50 - 0.90 mg/dL    Bun/Cre Ratio NOT REPORTED 9 - 20    Calcium 8.7 8.6 - 10.4 mg/dL    Sodium 140 135 - 144 mmol/L    Potassium 3.3 (L) 3.7 - 5.3 mmol/L    Chloride 106 98 - 107 mmol/L    CO2 22 20 - 31 mmol/L    Anion Gap 12 9 - 17 mmol/L    Alkaline Phosphatase 71 35 - 104 U/L    ALT <5 (L) 5 - 33 U/L    AST 12 <32 U/L    Total Bilirubin 0.38 0.3 - 1.2 mg/dL    Total Protein 5.7 (L) 6.4 - 8.3 g/dL    Alb 3.0 (L) 3.5 - 5.2 g/dL    Albumin/Globulin Ratio 1.1 1.0 - 2.5    GFR Non-African American 52 (L) >60 mL/min    GFR African American >60 >60 mL/min    GFR Comment          GFR Staging NOT REPORTED    LIPASE   Result Value Ref Range    Lipase 154 (H) 13 - 60 U/L   EKG 12 Lead   Result Value Ref Range    Ventricular Rate 80 BPM    Atrial Rate 80 BPM    P-R Interval 140 ms    QRS Duration 84 ms    Q-T Interval 412 ms    QTc Calculation (Bazett) 475 ms    P Axis 88 degrees    R Axis 24 degrees    T Axis 51 degrees IMAGING DATA:    Ct Abdomen Pelvis Wo Contrast Additional Contrast? None    Result Date: 11/11/2020  EXAMINATION: CT OF THE ABDOMEN AND PELVIS WITHOUT CONTRAST 11/11/2020 3:40 pm TECHNIQUE: CT of the abdomen and pelvis was performed without the administration of intravenous contrast. Multiplanar reformatted images are provided for review. Dose modulation, iterative reconstruction, and/or weight based adjustment of the mA/kV was utilized to reduce the radiation dose to as low as reasonably achievable. COMPARISON: None. HISTORY: ORDERING SYSTEM PROVIDED HISTORY: elevated lipase TECHNOLOGIST PROVIDED HISTORY: elevated lipase Reason for Exam: elevated lipase Type of Exam: Unknown FINDINGS: Lower Chest: Linear scarring/atelectasis in the right upper and middle lobes. There are multiple bilateral noncalcified pulmonary nodules measuring up to 1.3 cm in the lingula. Partially calcified left upper lobe nodule measures 1 cm in is partially visualized. Coronary artery atherosclerosis. Organs: Within the limitations of a noncontrast examination, no acute abnormality within the liver, gallbladder, pancreas, adrenal glands, or kidneys. There is a 5.1 x 5.5 cm hyperattenuating lesion within the spleen with rim calcification. GI/Bowel: Moderate hiatal hernia. Stomach is nondistended. The small bowel is nondilated. The colon is normal in caliber with a few scattered, noninflamed diverticula. Pelvis: Bladder is nondistended. No vesicular stone. Prior hysterectomy. Peritoneum/Retroperitoneum: No ascites or pneumoperitoneum. Atherosclerosis of the nondilated abdominal aorta. Bones/Soft Tissues: Multilevel degenerative changes of the lumbar spine. No acute osseous abnormality. Degenerative changes within the left hip. 1. No acute intra-abdominal abnormality. 2. 5.5 cm hyperattenuating splenic lesion with peripheral calcification is indeterminate. Multiphase MRI or CT may be useful for further characterization. utilized to reduce the radiation dose to as low as reasonably achievable. COMPARISON: CT abdomen and pelvis dated 11/11/2020 HISTORY: ORDERING SYSTEM PROVIDED HISTORY: Nodules noted on abdominal CT scan. TECHNOLOGIST PROVIDED HISTORY: Nodules noted on abdominal CT scan. Reason for Exam: Nodules noted on abdominal CT scan Acuity: Unknown Type of Exam: Unknown FINDINGS: Mediastinum: The heart is mildly prominent in size. There is no pericardial effusion. Thoracic aorta and pulmonary arteries are normal in caliber. There is moderate coronary artery calcification. No mediastinal or hilar lymphadenopathy. Lungs/pleura: There are multiple bilateral pulmonary nodules. Several 0.3-0.4 cm nodules are seen in the apices. There is a 0.9 cm nodule in the superior segment right lower lobe, image 46. There is a mildly spiculated 1.1 x 1.2 cm nodule in the left upper lobe, image 32. There is a 1.3 cm nodule in the lingula, image 71. Several additional scattered 0.4-0.5 cm nodules are seen in both lungs. There are linear opacities in the lingula, right upper lobe, and right middle lobe, likely atelectasis and/or scarring. No pneumothorax or pleural effusion. There is mild scattered atelectasis. Upper Abdomen: Calcified splenic lesion is again noted, previously described on CT abdomen and pelvis dated 11/11/2020. No acute findings in the visualized upper abdomen. There is a small hiatal hernia. Soft Tissues/Bones: There is a small indeterminate sclerotic focus at the T8 vertebral body. There is a heterogeneous lesion in the sternum, favored to be a hemangioma. No other focal bony lesions. No acute osseous abnormality. 1. Multiple bilateral pulmonary nodules, measuring up to 1.2 cm in the left upper lobe and 1.3 cm in the lingula. Follow-up CT chest recommended in 3-6 months. Alternatively, if clinically indicated, further evaluation with PET/CT or tissue sampling could be considered.  2. Redemonstration of the calcified splenic lesion, previously described in the CT abdomen and pelvis report dated 11/11/2020. 3. Small hiatal hernia. 4. Small indeterminate sclerotic focus in the T8 vertebral body. This may be a benign hemangioma, but if the patient has history of malignancy, metastatic focus could be considered. Mri Thoracic Spine W Wo Contrast    Result Date: 11/13/2020  EXAMINATION: MRI OF THE THORACIC SPINE WITHOUT AND WITH CONTRAST  11/13/2020 2:59 pm TECHNIQUE: Multiplanar multisequence MRI of the thoracic spine was performed without and with the administration of intravenous contrast. COMPARISON: None HISTORY: ORDERING SYSTEM PROVIDED HISTORY: T8 metastasis TECHNOLOGIST PROVIDED HISTORY: T8 metastasis Reason for Exam: t8 metastasis FINDINGS: BONES/ALIGNMENT: There is a normal thoracic kyphosis. The vertebral body heights are maintained. There is a T1/T2 hypointense focus in the T8 vertebral body that demonstrates peripheral edema and homogeneous enhancement. Overall this measures approximately 1.2 x 1.3 x 1.2 cm. There is no spondylolisthesis. SPINAL CORD: The spinal cord is normal in caliber and signal. SOFT TISSUES:  Posterior paraspinal soft tissues are unremarkable. Visualized thoracic and upper abdominal soft tissues are unremarkable DEGENERATIVE CHANGES: There is multilevel degenerative disc disease with loss of disc signal.  There is no disc space narrowing. There is no canal stenosis or foraminal narrowing. Enhancing focus in the T8 vertebral body consistent with patient's history a metastatic lesion. IMPRESSION:   Primary Problem  <principal problem not specified>    Active Hospital Problems    Diagnosis Date Noted    Acute recurrent pancreatitis [K85.90] 11/11/2020     1. Multiple pulmonary nodules  2. Hypertension  3.   History of breast cancer  T8 enhancing lesion      RECOMMENDATIONS:  1. I personally reviewed results of lab work-up imaging studies and other relevant clinical data.  2. Reviewed images of patient's MRI  3. Discussed with medicine team  4. Discussed with patient's daughter at bedside explained to the daughter that There are no tumor markers to diagnose cancer. Biopsy will be needed to diagnose a cancer and tumor markers can be used to follow disease status in some cases. 5. Lung nodule have been stable. Pulmonary team on board  Follow-up on recommendations from neurosurgery team regarding tissue sampling for T8 lesion. Patient is interested in pursuing further work-up. .  We will see if neurosurgery can biopsy it otherwise request IR  Patient has history of breast cancer. Per patient she was treated with lumpectomy and radiation. Did not receive any chemotherapy or antihormone therapy       DVT prophylaxis  Continue supportive care primary        Thank you for asking us to see this patient.     Sandra Tam

## 2020-11-14 NOTE — PLAN OF CARE
Problem: Falls - Risk of:  Goal: Will remain free from falls  Description: Will remain free from falls  Outcome: Ongoing  Goal: Absence of physical injury  Description: Absence of physical injury  Outcome: Ongoing     Problem: Infection:  Goal: Will remain free from infection  Description: Will remain free from infection  Outcome: Ongoing     Problem: Safety:  Goal: Free from accidental physical injury  Description: Free from accidental physical injury  Outcome: Ongoing  Goal: Free from intentional harm  Description: Free from intentional harm  Outcome: Ongoing     Problem: Daily Care:  Goal: Daily care needs are met  Description: Daily care needs are met  Outcome: Ongoing     Problem: Pain:  Goal: Patient's pain/discomfort is manageable  Description: Patient's pain/discomfort is manageable  Outcome: Ongoing     Problem: Skin Integrity:  Goal: Skin integrity will stabilize  Description: Skin integrity will stabilize  Outcome: Ongoing     Problem: Discharge Planning:  Goal: Patients continuum of care needs are met  Description: Patients continuum of care needs are met  Outcome: Ongoing     Problem: Nutrition  Goal: Optimal nutrition therapy  Outcome: Ongoing

## 2020-11-14 NOTE — PROGRESS NOTES
[facility]  CDU / OBSERVATION eNCOUnter  Attending NOte       I performed a history and physical examination of the patient and discussed management with the resident. I reviewed the residents note and agree with the documented findings and plan of care. Any areas of disagreement are noted on the chart. I was personally present for the key portions of any procedures. I have documented in the chart those procedures where I was not present during the key portions. I have reviewed the nurses notes. I agree with the chief complaint, past medical history, past surgical history, allergies, medications, social and family history as documented unless otherwise noted below. The Family history, social history, and ROS are effectively unchanged since admission unless noted elsewhere in the chart. Patient for oncologic work-up. Appreciate oncology consultants input. Patient with lesion consistent with metastatic disease. Will need tissue diagnosis. Patient with ongoing neurologic work-up necessary. MRI to be done. Patient with nausea and difficulty handling p.o. Patient requiring IV fluids and further testing per oncology.   Will attempt tissue diagnosis by Monday    Dayron Vergara MD  Attending Emergency  Physician

## 2020-11-14 NOTE — PROGRESS NOTES
OBS/CDU   RESIDENT NOTE      Patients PCP is:  Adriana Restrepo        SUBJECTIVE      No acute events overnight. Patient continues to complain of nausea and inability to tolerate oral intake. Continue to educate that we are looking for the most likely cause of continued nausea. Updated family and patient at bedside on findings of MRI. The patient is urinating on his own and is passing flatus. Denies fever, chills, , chest pain, shortness of breath, abdominal pain, focal weakness, numbness, tingling, urinary/bowel symptoms, vision changes, visual hallucinations, or headache. PHYSICAL EXAM      General: NAD, AO X 3  Heent: EMOI, PERRL  Neck: SUPPLE, NO JVD  Cardiovascular: RRR, S1S2  Pulmonary: CTAB, NO SOB  Abdomen: SOFT, NTTP, ND, +BS  Extremities: +2/4 PULSES DISTAL, NO SWELLING  Neuro / Psych: NO NUMBNESS OR TINGLING, MENTATION AT BASELINE    PERTINENT TEST /EXAMS      I have reviewed all available laboratory results. MEDICATIONS CURRENT   promethazine (PHENERGAN) tablet 25 mg, Q6H PRN  sodium chloride flush 0.9 % injection 10 mL, PRN  sodium chloride flush 0.9 % injection 10 mL, 2 times per day  sodium chloride flush 0.9 % injection 10 mL, PRN  lactated ringers infusion, Continuous  amLODIPine (NORVASC) tablet 5 mg, Nightly  clonazePAM (KLONOPIN) tablet 1 mg, Nightly  FLUoxetine (PROZAC) capsule 20 mg, Daily  pantoprazole (PROTONIX) tablet 40 mg, QAM AC  acetaminophen (TYLENOL) tablet 650 mg, Q4H PRN  enoxaparin (LOVENOX) injection 30 mg, Daily  ondansetron (ZOFRAN) injection 4 mg, Q6H PRN  promethazine (PHENERGAN) injection 12.5 mg, Q6H PRN  melatonin tablet 6 mg, Nightly    And  melatonin tablet 4 mg, Nightly        All medication charted and reviewed.     CONSULTS      IP CONSULT TO GI  IP CONSULT TO IV TEAM  IP CONSULT TO ONCOLOGY  IP CONSULT TO NEUROSURGERY  IP CONSULT TO SPIRITUAL SERVICES    ASSESSMENT/PLAN       Gemma Montana a 79 y.o. female who presents with nausea and vomiting and associated elevated lipase.     · Gastroenterology consulted - signed off. Outpatient follow up with Dr. Jesus Kraus for EUS   · Creatinine trending down. · Lipase 259   · CT head no acute abnormalities   · CT chest showing multiple pulmonary nodules, indeterminate sclerotic lesion in T8. · Phenergan added for nausea control. · Patient has been unable to tolerate oral intake. · Neurosurgery consulted - ordered MRI. · MRI of T-spine showing enhancing focus in the T8 vertebral body consistent with possible metastatic lesion. Oncology stating may need bone biopsy. · Surgical team signed off. No neurosurgical intervention per them at this time. · Oncology consulted for pulmonary nodules. Patient has history of pulmonary nodules on CT. They are recommending outpatient follow-up with hematology and oncology with repeat CT in 6 months.        · Continue home medications and pain control  · Monitor vitals, labs, and imaging  · DISPO: pending consults and clinical improvement    --  [facility]  Emergency Medicine Resident Physician     This dictation was generated by voice recognition computer software. Although all attempts are made to edit the dictation for accuracy, there may be errors in the transcription that are not intended. No

## 2020-11-14 NOTE — PROGRESS NOTES
nasal congestion, sore throat or visual changes  Allergy and Immunology: negative for postnasal drip or seasonal allergies  Hematological and Lymphatic: negative for bleeding problems, swollen lymph nodes  Respiratory: positive for cough negative for orthopnea, pleuritic pain or shortness of breath  Cardiovascular: negative for edema or palpitations  Gastrointestinal: negative for abdominal pain, change in bowel habits or nausea/vomiting  Genito-Urinary: negative for dysuria or urinary frequency/urgency  Musculoskeletal: negative for joint pain or joint swelling  Neurological: negative for numbness/tingling, seizures or weakness  Dermatological: negative for pruritus or rash    OBJECTIVE     VITAL SIGNS:   LAST-  BP (!) 100/54   Pulse 83   Temp 98.4 °F (36.9 °C) (Oral)   Resp 18   Ht 5' (1.524 m)   Wt 140 lb (63.5 kg)   SpO2 94%   BMI 27.34 kg/m²   8-24 HR RANGE-  TEMP Temp  Av.3 °F (36.8 °C)  Min: 98.2 °F (36.8 °C)  Max: 98.4 °F (51.4 °C)   BP Systolic (71HGV), CDW:905 , Min:100 , ICU:791      Diastolic (34DOI), MAR:62, Min:54, Max:79     PULSE Pulse  Av.5  Min: 80  Max: 83   RR No data recorded   O2 SAT No data recorded   OXYGEN DELIVERY No data recorded     Systemic Examination:   General appearance - looks comfortable and in no acute distress  Mental status - alert, oriented to person, place, and time  Eyes - pupils equal and reactive, extraocular eye movements intact  Mouth - mucous membranes moist, pharynx normal without lesions  Neck - supple, no significant adenopathy  Chest - Chest was symmetrical without dullness to percussion. Breath sounds bilaterally were clear to auscultation. There were no wheezes, rhonchi or rales.   There is no intercostal recession or use of accessory muscles  Heart - normal rate, regular rhythm, normal S1, S2, no murmurs, rubs, clicks or gallops  Abdomen - soft, nontender, nondistended, no masses or organomegaly  Neurological - alert, oriented, normal speech, no focal findings or movement disorder noted  Extremities - peripheral pulses normal, no pedal edema, no clubbing or cyanosis  Skin - normal coloration and turgor, no rashes, no suspicious skin lesions noted     DATA REVIEW     Medications:  Scheduled Meds:   sodium chloride flush  10 mL Intravenous 2 times per day    amLODIPine  5 mg Oral Nightly    clonazePAM  1 mg Oral Nightly    FLUoxetine  20 mg Oral Daily    pantoprazole  40 mg Oral QAM AC    enoxaparin  30 mg Subcutaneous Daily    melatonin  6 mg Oral Nightly    And    melatonin  4 mg Oral Nightly     Continuous Infusions:   lactated ringers 100 mL/hr at 11/13/20 2143     LABS:-  ABGs:   No results found for: PH, PCO2, PO2, HCO3, O2SAT  No results for input(s): PHART, PO2ART, PFZ1HXO, EAB6GRI, BEART, T3CGQMRW in the last 72 hours. No results found for: POCPH, POCPCO2, POCPO2, POCHCO3, XBHY4PKG  CBC:   Recent Labs     11/11/20  1244   WBC 6.9   HGB 13.6   HCT 43.3   MCV 83.6      RBC 5.18*   MCH 26.3   MCHC 31.4   RDW 16.9*     BMP:   Recent Labs     11/11/20  1244 11/12/20  1627    139   K 3.8 3.2*   CL 99 106   CO2 22 22   BUN 14 6*   CREATININE 1.33* 1.09*   GLUCOSE 109* 104*     Liver Function Test:   Recent Labs     11/11/20  1244   PROT 6.8   LABALBU 3.7   ALT 8   AST 14   ALKPHOS 91   BILITOT 0.41     Amylase/Lipase:  Recent Labs     11/11/20  1244 11/12/20  0901   LIPASE 233* 259*     Coagulation Profile:   No results for input(s): INR, PROTIME, APTT in the last 72 hours. Cardiac Enzymes:  No results for input(s): CKTOTAL, CKMB, CKMBINDEX, TROPONINI in the last 72 hours.   Lactic Acid:  No results found for: LACTA  BNP:   No results found for: BNP  D-Dimer:  No results found for: DDIMER  Others:   No results found for: TSH, P3PUKNN, H3FCGTE, THYROIDAB, FT3, T4FREE  No results found for: DAGMAR, RHEUMFACTOR, SEDRATE, CRP  No results found for: LABURIC, URICACID  No results found for: IRON, TIBC, FERRITIN  No results found for: SPEP, UPEP  No results found for: PSA, CEA, , [id number],     Input/Output:  No intake or output data in the 24 hours ending 11/14/20 0834    Microbiology:    Pathology:    Radiology Reports:  MRI [address]   Final Result   Enhancing focus in the T8 vertebral body consistent with patient's history a   metastatic lesion. CT HEAD WO CONTRAST   Final Result   No acute intracranial abnormality. Cerebral atrophy. CT CHEST WO CONTRAST   Final Result   1. Multiple bilateral pulmonary nodules, measuring up to 1.2 cm in the left   upper lobe and 1.3 cm in the lingula. Follow-up CT chest recommended in 3-6   months. Alternatively, if clinically indicated, further evaluation with   PET/CT or tissue sampling could be considered. 2. Redemonstration of the calcified splenic lesion, previously described in   the CT abdomen and pelvis report dated 11/11/2020.   3. Small hiatal hernia. 4. Small indeterminate sclerotic focus in the T8 vertebral body. This may be   a benign hemangioma, but if the patient has history of malignancy, metastatic   focus could be considered. CT ABDOMEN PELVIS WO CONTRAST Additional Contrast? None   Final Result   1. No acute intra-abdominal abnormality. 2. 5.5 cm hyperattenuating splenic lesion with peripheral calcification is   indeterminate. Multiphase MRI or CT may be useful for further   characterization. 3. Bilateral pulmonary nodules measuring up to 1.3 cm. RECOMMENDATIONS:   Guidelines for follow-up and management of pulmonary nodules found on   incomplete chest CT:      >8mm - immediate chest CT for further evaluation. Reference:  Radiology. 2017 Jul; 284(1):228-243             Echocardiogram:   No results found for this or any previous visit. Cardiac Catheterization:   No results found for this or any previous visit.     ASSESSMENT AND PLAN     Assessment:    //Multiple pulmonary nodules  //History of pulmonary nodules on multiple images in the past  //Acute recurrent pancreatitis  //Breast cancer in past  //T8 sclerotic focus on MRI thoracic spine  //Hypertensio  //Recent PE 6/20-on eliquis    Plan:     -Patient remains hemodynamically stable and is currently saturating well on room air  -Continued on supportive care per primary  -Requested CT chest images from [facility]  -Evaluated by neurosurgery and Hem oncology for T8 sclerotic focus. MRI thoracic spine showed hypointense focus in T8 vertebral body with remote history of breast cancer. May need bone biopsy.  -Continue to monitor I/O with a goal of even/negative fluid balance  -DVT and stress ulcer prophylaxis  -Physical/occupational therapy; increase activity as tolerated. I updated the patient regarding the current clinical condition, provisional diagnosis and management plan. I addressed concerns and answered all questions to the best of my abilities. It was my pleasure to evaluate Laura Holden today. We will continue to follow. I would like to thank you for allowing me to participate in the care of this patient. Please feel free to call with any further questions or concerns. Kelby Reddy M.D. Pulmonary and Critical Care Medicine           11/14/2020, 8:34 AM    Please note that this chart was generated using voice recognition Dragon dictation software. Although every effort was made to ensure the accuracy of this automated transcription, some errors in transcription may have occurred.

## 2020-11-14 NOTE — PROGRESS NOTES
[facility]    Neurosurgery Service  Resident Daily Progress Note   11/14/2020 10:51 AM     Subjective    No acute events overnight. No new complaints. Objective    Vitals:    11/13/20 1409 11/13/20 1746 11/13/20 2100 11/14/20 0950   BP:  139/79 (!) 100/54 (!) 145/82   Pulse:  80 83 90   Resp:   18 18   Temp:  98.2 °F (36.8 °C) 98.4 °F (36.9 °C) 97.3 °F (36.3 °C)   TempSrc:  Oral Oral Oral   SpO2:  95% 94% 93%   Weight:       Height: 5' (1.524 m)          Physical Exam:  Gen: Resting comfortably, no acute distress  CV: RRR  Resp: CTAB  GI: Abdomen soft, non-tender  Skin: Cap refill< 2 seconds  Neuro:    A&Ox3   PERRL, EOMI   CNII-XII intact   Normal speech and mentation  No focal sensory or motor deficits    Labs:  Lab Results   Component Value Date    WBC 6.9 11/11/2020    HGB 13.6 11/11/2020    HCT 43.3 11/11/2020     11/11/2020    ALT 8 11/11/2020    AST 14 11/11/2020     11/12/2020    K 3.2 (L) 11/12/2020     11/12/2020    CREATININE 1.09 (H) 11/12/2020    BUN 6 (L) 11/12/2020    CO2 22 11/12/2020       Radiology (last 24 hours):    MRI thoracic spine performed yesterday demonstrating T8 vertebral body lesion consistent with metastasis lesion    ASSESSMENT & PLAN:    Gurdeep Jones is a 79 y.o. female who presents with back pain. Labs and imaging were reviewed with Dr. Wendy Finnegan    No neurosurgical intervention currently planned. Recommend patient be evaluated by oncology to assess for further needs. Neurosurgery will sign off at this time.         Werner Schafer DO  PGY-3 Emergency Medicine Resident Physician  Neurosurgery/Neuro Critical Care Team  Pager [phone]

## 2020-11-15 PROCEDURE — 6370000000 HC RX 637 (ALT 250 FOR IP): Performed by: STUDENT IN AN ORGANIZED HEALTH CARE EDUCATION/TRAINING PROGRAM

## 2020-11-15 PROCEDURE — 6370000000 HC RX 637 (ALT 250 FOR IP): Performed by: GENERAL PRACTICE

## 2020-11-15 PROCEDURE — 6370000000 HC RX 637 (ALT 250 FOR IP): Performed by: EMERGENCY MEDICINE

## 2020-11-15 PROCEDURE — 6360000002 HC RX W HCPCS: Performed by: STUDENT IN AN ORGANIZED HEALTH CARE EDUCATION/TRAINING PROGRAM

## 2020-11-15 PROCEDURE — 1200000000 HC SEMI PRIVATE

## 2020-11-15 PROCEDURE — 99232 SBSQ HOSP IP/OBS MODERATE 35: CPT | Performed by: INTERNAL MEDICINE

## 2020-11-15 RX ORDER — SCOLOPAMINE TRANSDERMAL SYSTEM 1 MG/1
1 PATCH, EXTENDED RELEASE TRANSDERMAL
Status: DISCONTINUED | OUTPATIENT
Start: 2020-11-15 | End: 2020-11-16 | Stop reason: HOSPADM

## 2020-11-15 RX ORDER — MECLIZINE HCL 12.5 MG/1
12.5 TABLET ORAL ONCE
Status: COMPLETED | OUTPATIENT
Start: 2020-11-15 | End: 2020-11-15

## 2020-11-15 RX ORDER — GUAIFENESIN 600 MG/1
600 TABLET, EXTENDED RELEASE ORAL 2 TIMES DAILY
Status: DISCONTINUED | OUTPATIENT
Start: 2020-11-15 | End: 2020-11-16 | Stop reason: HOSPADM

## 2020-11-15 RX ADMIN — ONDANSETRON 4 MG: 2 INJECTION INTRAMUSCULAR; INTRAVENOUS at 06:47

## 2020-11-15 RX ADMIN — LEVOTHYROXINE SODIUM 25 MCG: 25 TABLET ORAL at 06:47

## 2020-11-15 RX ADMIN — PROMETHAZINE HYDROCHLORIDE 12.5 MG: 25 INJECTION INTRAMUSCULAR; INTRAVENOUS at 08:24

## 2020-11-15 RX ADMIN — GUAIFENESIN 600 MG: 600 TABLET, EXTENDED RELEASE ORAL at 13:18

## 2020-11-15 RX ADMIN — Medication 4 MG: at 19:49

## 2020-11-15 RX ADMIN — CLONAZEPAM 1 MG: 1 TABLET ORAL at 19:48

## 2020-11-15 RX ADMIN — MECLIZINE HYDROCHLORIDE 12.5 MG: 12.5 TABLET, FILM COATED ORAL at 13:18

## 2020-11-15 RX ADMIN — Medication 6 MG: at 19:48

## 2020-11-15 RX ADMIN — DILTIAZEM HYDROCHLORIDE 120 MG: 120 CAPSULE, COATED, EXTENDED RELEASE ORAL at 09:20

## 2020-11-15 RX ADMIN — GUAIFENESIN 600 MG: 600 TABLET, EXTENDED RELEASE ORAL at 19:48

## 2020-11-15 RX ADMIN — APIXABAN 5 MG: 5 TABLET, FILM COATED ORAL at 19:48

## 2020-11-15 RX ADMIN — PROMETHAZINE HYDROCHLORIDE 25 MG: 25 TABLET ORAL at 18:26

## 2020-11-15 RX ADMIN — PANTOPRAZOLE SODIUM 40 MG: 40 TABLET, DELAYED RELEASE ORAL at 06:48

## 2020-11-15 RX ADMIN — APIXABAN 5 MG: 5 TABLET, FILM COATED ORAL at 09:20

## 2020-11-15 RX ADMIN — FLUOXETINE HYDROCHLORIDE 20 MG: 20 CAPSULE ORAL at 09:20

## 2020-11-15 ASSESSMENT — PAIN SCALES - GENERAL
PAINLEVEL_OUTOF10: 0

## 2020-11-15 NOTE — PROGRESS NOTES
OBS/CDU   RESIDENT NOTE      Patients PCP is:  Adriana Restrepo        SUBJECTIVE      No acute events overnight. Continues to complain of significant nausea, no episodes of emesis overnight. Patient stating that none of the medicines have helped significantly     The patient is urinating on her own and is passing flatus. Denies fever, chills, nausea, vomiting, chest pain, shortness of breath, abdominal pain, focal weakness, numbness, tingling, urinary/bowel symptoms, vision changes, visual hallucinations, or headache. PHYSICAL EXAM      General: NAD, AO X 3  Heent: EMOI, PERRL  Neck: SUPPLE, NO JVD  Cardiovascular: RRR, S1S2  Pulmonary: CTAB, NO SOB  Abdomen: SOFT, NTTP, ND, +BS  Extremities: +2/4 PULSES DISTAL, NO SWELLING  Neuro / Psych: NO NUMBNESS OR TINGLING, MENTATION AT BASELINE    PERTINENT TEST /EXAMS      I have reviewed all available laboratory results. MEDICATIONS CURRENT   dilTIAZem (CARDIZEM CD) extended release capsule 120 mg, Daily  apixaban (ELIQUIS) tablet 5 mg, BID  levothyroxine (SYNTHROID) tablet 25 mcg, Daily  promethazine (PHENERGAN) tablet 25 mg, Q6H PRN  sodium chloride flush 0.9 % injection 10 mL, PRN  sodium chloride flush 0.9 % injection 10 mL, 2 times per day  sodium chloride flush 0.9 % injection 10 mL, PRN  lactated ringers infusion, Continuous  clonazePAM (KLONOPIN) tablet 1 mg, Nightly  FLUoxetine (PROZAC) capsule 20 mg, Daily  pantoprazole (PROTONIX) tablet 40 mg, QAM AC  acetaminophen (TYLENOL) tablet 650 mg, Q4H PRN  ondansetron (ZOFRAN) injection 4 mg, Q6H PRN  promethazine (PHENERGAN) injection 12.5 mg, Q6H PRN  melatonin tablet 6 mg, Nightly    And  melatonin tablet 4 mg, Nightly        All medication charted and reviewed.     CONSULTS      IP CONSULT TO GI  IP CONSULT TO IV TEAM  IP CONSULT TO ONCOLOGY  IP CONSULT TO NEUROSURGERY  IP CONSULT TO SPIRITUAL SERVICES    ASSESSMENT/PLAN       Ryne Guajardo a 79 y.o. female who presents with nausea and vomiting and associated elevated lipase.     · Gastroenterology consulted - signed off. Outpatient follow up with Dr. Kimberly Ku for EUS   · Creatinine trending down. · Lipase 259. Repeat this am trending down at 154. · CT head no acute abnormalities   · CT chest showing multiple pulmonary nodules, indeterminate sclerotic lesion in T8. · Phenergan added for nausea control. · Patient has been unable to tolerate oral intake.   · Neurosurgery signed off. MRI showing lucency of T8 vertebral body. · MRI of T-spine showing enhancing focus in the T8 vertebral body consistent with possible metastatic lesion. Oncology stating may need bone biopsy. · IR for bone biopsy tomorrow   · Oncology consulted for pulmonary nodules.   Patient has history of pulmonary nodules on CT. They are recommending outpatient follow-up with hematology and oncology with repeat CT in 6 months.        · Continue home medications and pain control  · Monitor vitals, labs, and imaging  · DISPO: pending consults and clinical improvement    --  Phil Cordova  Emergency Medicine Resident Physician     This dictation was generated by voice recognition computer software. Although all attempts are made to edit the dictation for accuracy, there may be errors in the transcription that are not intended.

## 2020-11-15 NOTE — PLAN OF CARE
Problem: Falls - Risk of:  Goal: Will remain free from falls  Description: Will remain free from falls  Outcome: Ongoing  Goal: Absence of physical injury  Description: Absence of physical injury  Outcome: Ongoing     Problem: Infection:  Goal: Will remain free from infection  Description: Will remain free from infection  Outcome: Ongoing     Problem: Safety:  Goal: Free from accidental physical injury  Description: Free from accidental physical injury  Outcome: Ongoing  Goal: Free from intentional harm  Description: Free from intentional harm  Outcome: Ongoing

## 2020-11-15 NOTE — PROGRESS NOTES
Today's Date: 11/15/2020  Patient Name: Nikole Gutierres  Date of admission: 2020 12:16 PM  Patient's age: 79 y. o., 1940  Admission Dx: Acute recurrent pancreatitis [K85.90]  Acute recurrent pancreatitis [K85.90]    Reason for Consult: management recommendations  Requesting Physician: Christina Montoya MD    CHIEF COMPLAINT: Nausea and vomiting    History Obtained From:  patient, electronic medical record    Interval history:    Patient seen and examined. Labs and vitals reviewed  Continues to complain of nausea. Complains of abdominal discomfort  Denies fever chills  Patient scheduled for bone biopsy tomorrow    HISTORY OF PRESENT ILLNESS:      The patient is a 79 y.o.  female with past medical history significant for breast cancer 21 years ago s/p right sided lumpectomy, radiation, hypertension admitted with nausea and emesis since the past 1 month. Patient reported that she has been feeling sick since the past 1 month, admits to having chills, night sweats, weight loss of 10 to 15 pounds in the past 2 months, loss of appetite. Recent EGD which was done at [facility] showed gastritis, all GI work-up unremarkable so far. CT chest showed multiple bilateral pulmonary nodules, 1.2 cm left upper lobe, 1.3 cm lingula. Patient had multiple CT scans done at [address] [place] [address] which showed pulmonary nodules. Hematology and oncology consulted for pulmonary nodules. Patient seen and examined. Denies any chest pain, worsening shortness of breath, fevers. Has chronic dry cough. Hemodynamically stable. Denies any smoking or alcohol intake. Past Medical History:   has a past medical history of Anxiety, Arthritis, Diarrhea, Hyperlipidemia, Hypertension, and Lymphocytic colitis. Past Surgical History:   has a past surgical history that includes Hysterectomy;   section; Breast lumpectomy (Right); pr egd transoral biopsy single/multiple (N/A, 3/7/2018); and pr colonoscopy w/biopsy single/multiple (N/A, 3/7/2018). Medications:    Prior to Admission medications    Medication Sig Start Date End Date Taking? Authorizing Provider   apixaban (ELIQUIS) 5 MG TABS tablet Take 5 mg by mouth 2 times daily   Yes Historical Provider, MD   levothyroxine (SYNTHROID) 25 MCG tablet Take 25 mcg by mouth Daily   Yes Historical Provider, MD   promethazine (PHENERGAN) 25 MG tablet Take 25 mg by mouth 2 times daily as needed for Nausea   Yes Historical Provider, MD   dilTIAZem (CARDIZEM CD) 120 MG extended release capsule Take 120 mg by mouth daily   Yes Historical Provider, MD   cholestyramine light 4 g packet Take 1 packet by mouth nightly 3/7/18  Yes Jimmy Hernandez MD   pantoprazole (PROTONIX) 40 MG tablet Take 1 tablet by mouth every morning (before breakfast) 3/8/18  Yes Jimmy Hernandez MD   FLUoxetine (PROZAC) 10 MG capsule Take 20 mg by mouth daily    Yes Historical Provider, MD   clonazePAM (KLONOPIN) 1 MG tablet Take 1 mg by mouth nightly as needed Unknown dose.     Historical Provider, MD   metoprolol tartrate (LOPRESSOR) 25 MG tablet Take 25 mg by mouth nightly Unknown dose     Historical Provider, MD   amLODIPine (NORVASC) 5 MG tablet Take 5 mg by mouth nightly     Historical Provider, MD     Current Facility-Administered Medications   Medication Dose Route Frequency Provider Last Rate Last Dose    scopolamine (TRANSDERM-SCOP) transdermal patch 1 patch  1 patch Transdermal Q72H Alba Smith MD   1 patch at 11/15/20 1318    guaiFENesin [facility]) extended release tablet 600 mg  600 mg Oral BID Alba Smith MD   600 mg at 11/15/20 1318    dilTIAZem (CARDIZEM CD) extended release capsule 120 mg  120 mg Oral Daily Jonna Bernard, DO   120 mg at 11/15/20 0920    apixaban (ELIQUIS) tablet 5 mg  5 mg Oral BID Donn Bernard DO   5 mg at 11/15/20 0920    levothyroxine (SYNTHROID) tablet 25 mcg  25 mcg Oral Daily Leandra Savage DO   25 mcg at 11/15/20 0647    promethazine (PHENERGAN) tablet 25 mg  25 mg Oral Q6H PRN Kelsey Grayson, DO        sodium chloride flush 0.9 % injection 10 mL  10 mL Intravenous PRN Mamie Holloway, APRN - NP        sodium chloride flush 0.9 % injection 10 mL  10 mL Intravenous 2 times per day Enoc Bob, DO        sodium chloride flush 0.9 % injection 10 mL  10 mL Intravenous PRN Belkis Richard Paulino, DO        lactated ringers infusion   Intravenous Continuous Adriana Uche, APRN -  mL/hr at 11/13/20 2143      clonazePAM (KLONOPIN) tablet 1 mg  1 mg Oral Nightly Belkis Rowlett Paulino, DO   1 mg at 11/14/20 2148    FLUoxetine (PROZAC) capsule 20 mg  20 mg Oral Daily Enoc Bob, DO   20 mg at 11/15/20 0920    pantoprazole (PROTONIX) tablet 40 mg  40 mg Oral QAM AC Enoc LINDSAY Paulino, DO   40 mg at 11/15/20 2866    acetaminophen (TYLENOL) tablet 650 mg  650 mg Oral Q4H PRN Belkis Rowlett Paulino, DO        ondansetron [facility]) injection 4 mg  4 mg Intravenous Q6H PRN Kelsey Grayson, DO   4 mg at 11/15/20 0647    promethazine (PHENERGAN) injection 12.5 mg  12.5 mg Intravenous Q6H PRN Asael Bernard, DO   12.5 mg at 11/15/20 8184    melatonin tablet 6 mg  6 mg Oral Nightly Stephanie Liter, DO   6 mg at 11/14/20 2041    And    melatonin tablet 4 mg  4 mg Oral Nightly Stephanie Liter, DO   4 mg at 11/14/20 2041       Allergies:  Patient has no known allergies. Social History:   reports that she has never smoked. She has never used smokeless tobacco. She reports that she does not drink alcohol or use drugs. Family History: family history is not on file.     REVIEW OF SYSTEMS:      Constitutional: + night sweats, +weight loss   Eyes: No eye discharge, double vision, or eye pain   HEENT: negative for sore mouth, sore throat, hoarseness and voice change   Respiratory: +dry cough , no sputum, dyspnea, wheezing, hemoptysis, chest pain   Cardiovascular: negative for chest pain, dyspnea, palpitations, orthopnea, PND   Gastrointestinal: +nausea,+ vomiting,no  diarrhea, constipation, abdominal pain, Dysphagia, hematemesis and hematochezia   Genitourinary: negative for frequency, dysuria, nocturia, urinary incontinence, and hematuria   Integument: negative for rash, skin lesions, bruises.    Hematologic/Lymphatic: negative for easy bruising, bleeding, lymphadenopathy, or petechiae   Endocrine: negative for heat or cold intolerance,weight changes, change in bowel habits and hair loss   Musculoskeletal: negative for myalgias, arthralgias, pain, joint swelling,and bone pain   Neurological: negative for headaches, dizziness, seizures, weakness, numbness    PHYSICAL EXAM:        /66   Pulse 83   Temp 97.9 °F (36.6 °C) (Oral)   Resp 16   Ht 5' (1.524 m)   Wt 140 lb (63.5 kg)   SpO2 95%   BMI 27.34 kg/m²    Temp (24hrs), Av.1 °F (36.7 °C), Min:97.9 °F (36.6 °C), Max:98.2 °F (36.8 °C)      General appearance - well appearing, in no distress   Mental status - alert and cooperative    Mouth - mucous membranes moist, pharynx normal without lesions   Neck - supple, no significant adenopathy   Lymphatics - no palpable lymphadenopathy, no hepatosplenomegaly   Chest - clear to auscultation, no wheezes, rales or rhonchi, symmetric air entry   Heart - normal rate, regular rhythm, normal S1, S2, no murmurs  Abdomen - soft, nontender, nondistended, no masses or organomegaly   Neurological - alert, oriented, normal speech, no focal findings or movement disorder noted   Musculoskeletal - no joint tenderness, deformity or swelling   Extremities - peripheral pulses normal, no pedal edema, no clubbing or cyanosis   Skin - normal coloration and turgor, no rashes, no suspicious skin lesions noted ,      DATA:      Labs:     Results for orders placed or performed during the hospital encounter of 20   CBC   Result Value Ref Range    WBC 6.9 3.5 - 11.3 k/uL    RBC 5.18 (H) 3.95 - 5.11 m/uL    Hemoglobin 13.6 11.9 - 15.1 g/dL    Hematocrit 43.3 36.3 - 47.1 %    MCV 83.6 82.6 - 102.9 fL    MCH 26.3 25.2 - 33.5 pg    MCHC 31.4 28.4 - 34.8 g/dL    RDW 16.9 (H) 11.8 - 14.4 %    Platelets 886 431 - 879 k/uL    MPV 11.7 8.1 - 13.5 fL    NRBC Automated 0.0 0.0 per 100 WBC   Basic Metabolic Panel   Result Value Ref Range    Glucose 109 (H) 70 - 99 mg/dL    BUN 14 8 - 23 mg/dL    CREATININE 1.33 (H) 0.50 - 0.90 mg/dL    Bun/Cre Ratio NOT REPORTED 9 - 20    Calcium 9.3 8.6 - 10.4 mg/dL    Sodium 135 135 - 144 mmol/L    Potassium 3.8 3.7 - 5.3 mmol/L    Chloride 99 98 - 107 mmol/L    CO2 22 20 - 31 mmol/L    Anion Gap 14 9 - 17 mmol/L    GFR Non-African American 38 (L) >60 mL/min    GFR  46 (L) >60 mL/min    GFR Comment          GFR Staging NOT REPORTED    LIPASE   Result Value Ref Range    Lipase 233 (H) 13 - 60 U/L   Hepatic Function Panel   Result Value Ref Range    Alb 3.7 3.5 - 5.2 g/dL    Alkaline Phosphatase 91 35 - 104 U/L    ALT 8 5 - 33 U/L    AST 14 <32 U/L    Total Bilirubin 0.41 0.3 - 1.2 mg/dL    Bilirubin, Direct 0.18 <0.31 mg/dL    Bilirubin, Indirect 0.23 0.00 - 1.00 mg/dL    Total Protein 6.8 6.4 - 8.3 g/dL    Globulin NOT REPORTED 1.5 - 3.8 g/dL    Albumin/Globulin Ratio 1.2 1.0 - 2.5   URINALYSIS   Result Value Ref Range    Color, UA DARK YELLOW (A) YELLOW    Turbidity UA CLOUDY (A) CLEAR    Glucose, Ur NEGATIVE NEGATIVE    Bilirubin Urine NEGATIVE NEGATIVE    Ketones, Urine SMALL (A) NEGATIVE    Specific Gravity, UA 1.027 1.005 - 1.030    Urine Hgb TRACE (A) NEGATIVE    pH, UA 5.0 5.0 - 8.0    Protein, UA 4+ (A) NEGATIVE    Urobilinogen, Urine Normal Normal    Nitrite, Urine NEGATIVE NEGATIVE    Leukocyte Esterase, Urine TRACE (A) NEGATIVE    Urinalysis Comments NOT REPORTED    Troponin   Result Value Ref Range    Troponin, High Sensitivity 24 (H) 0 - 14 ng/L    Troponin T NOT REPORTED <0.03 ng/mL    Troponin Interp NOT REPORTED    Microscopic Urinalysis   Result Value Ref Range    -          WBC, UA 50  0 - 5 /HPF    RBC, UA 2 TO 5 0 - 4 /HPF    Casts UA  0 - 8 /LPF     10 TO 20 HYALINE Reference range defined for non-centrifuged specimen. Crystals, UA NOT REPORTED None /HPF    Epithelial Cells UA 5 TO 10 0 - 5 /HPF    Renal Epithelial, UA NOT REPORTED 0 /HPF    Bacteria, UA NOT REPORTED None    Mucus, UA NOT REPORTED None    Trichomonas, UA NOT REPORTED None    Amorphous, UA NOT REPORTED None    Other Observations UA NOT REPORTED NOT REQ.     Yeast, UA NOT REPORTED None   Troponin   Result Value Ref Range    Troponin, High Sensitivity 21 (H) 0 - 14 ng/L    Troponin T NOT REPORTED <0.03 ng/mL    Troponin Interp NOT REPORTED    LIPASE   Result Value Ref Range    Lipase 259 (H) 13 - 60 U/L   Basic Metabolic Panel   Result Value Ref Range    Glucose 104 (H) 70 - 99 mg/dL    BUN 6 (L) 8 - 23 mg/dL    CREATININE 1.09 (H) 0.50 - 0.90 mg/dL    Bun/Cre Ratio NOT REPORTED 9 - 20    Calcium 8.4 (L) 8.6 - 10.4 mg/dL    Sodium 139 135 - 144 mmol/L    Potassium 3.2 (L) 3.7 - 5.3 mmol/L    Chloride 106 98 - 107 mmol/L    CO2 22 20 - 31 mmol/L    Anion Gap 11 9 - 17 mmol/L    GFR Non-African American 48 (L) >60 mL/min    GFR  58 (L) >60 mL/min    GFR Comment          GFR Staging NOT REPORTED    COMPREHENSIVE METABOLIC PANEL   Result Value Ref Range    Glucose 114 (H) 70 - 99 mg/dL    BUN 5 (L) 8 - 23 mg/dL    CREATININE 1.02 (H) 0.50 - 0.90 mg/dL    Bun/Cre Ratio NOT REPORTED 9 - 20    Calcium 8.7 8.6 - 10.4 mg/dL    Sodium 140 135 - 144 mmol/L    Potassium 3.3 (L) 3.7 - 5.3 mmol/L    Chloride 106 98 - 107 mmol/L    CO2 22 20 - 31 mmol/L    Anion Gap 12 9 - 17 mmol/L    Alkaline Phosphatase 71 35 - 104 U/L    ALT <5 (L) 5 - 33 U/L    AST 12 <32 U/L    Total Bilirubin 0.38 0.3 - 1.2 mg/dL    Total Protein 5.7 (L) 6.4 - 8.3 g/dL    Alb 3.0 (L) 3.5 - 5.2 g/dL    Albumin/Globulin Ratio 1.1 1.0 - 2.5    GFR Non-African American 52 (L) >60 mL/min    GFR African American >60 >60 mL/min    GFR Comment          GFR Staging NOT REPORTED    LIPASE   Result Value Ref Range    Lipase 154 (H) 13 - 60 U/L   EKG 12 Lead Result Value Ref Range    Ventricular Rate 80 BPM    Atrial Rate 80 BPM    P-R Interval 140 ms    QRS Duration 84 ms    Q-T Interval 412 ms    QTc Calculation (Bazett) 475 ms    P Axis 88 degrees    R Axis 24 degrees    T Axis 51 degrees         IMAGING DATA:    Ct Abdomen Pelvis Wo Contrast Additional Contrast? None    Result Date: 11/11/2020  EXAMINATION: CT OF THE ABDOMEN AND PELVIS WITHOUT CONTRAST 11/11/2020 3:40 pm TECHNIQUE: CT of the abdomen and pelvis was performed without the administration of intravenous contrast. Multiplanar reformatted images are provided for review. Dose modulation, iterative reconstruction, and/or weight based adjustment of the mA/kV was utilized to reduce the radiation dose to as low as reasonably achievable. COMPARISON: None. HISTORY: ORDERING SYSTEM PROVIDED HISTORY: elevated lipase TECHNOLOGIST PROVIDED HISTORY: elevated lipase Reason for Exam: elevated lipase Type of Exam: Unknown FINDINGS: Lower Chest: Linear scarring/atelectasis in the right upper and middle lobes. There are multiple bilateral noncalcified pulmonary nodules measuring up to 1.3 cm in the lingula. Partially calcified left upper lobe nodule measures 1 cm in is partially visualized. Coronary artery atherosclerosis. Organs: Within the limitations of a noncontrast examination, no acute abnormality within the liver, gallbladder, pancreas, adrenal glands, or kidneys. There is a 5.1 x 5.5 cm hyperattenuating lesion within the spleen with rim calcification. GI/Bowel: Moderate hiatal hernia. Stomach is nondistended. The small bowel is nondilated. The colon is normal in caliber with a few scattered, noninflamed diverticula. Pelvis: Bladder is nondistended. No vesicular stone. Prior hysterectomy. Peritoneum/Retroperitoneum: No ascites or pneumoperitoneum. Atherosclerosis of the nondilated abdominal aorta. Bones/Soft Tissues: Multilevel degenerative changes of the lumbar spine. No acute osseous abnormality. Degenerative changes within the left hip. 1. No acute intra-abdominal abnormality. 2. 5.5 cm hyperattenuating splenic lesion with peripheral calcification is indeterminate. Multiphase MRI or CT may be useful for further characterization. 3. Bilateral pulmonary nodules measuring up to 1.3 cm. RECOMMENDATIONS: Guidelines for follow-up and management of pulmonary nodules found on incomplete chest CT: >8mm - immediate chest CT for further evaluation. Reference:  Radiology. 2017 Jul; 284(1):228-243     Ct Head Wo Contrast    Result Date: 11/12/2020  EXAMINATION: CT OF THE HEAD WITHOUT CONTRAST  11/12/2020 3:06 pm TECHNIQUE: CT of the head was performed without the administration of intravenous contrast. Dose modulation, iterative reconstruction, and/or weight based adjustment of the mA/kV was utilized to reduce the radiation dose to as low as reasonably achievable. COMPARISON: None. HISTORY: ORDERING SYSTEM PROVIDED HISTORY: episodes of AMS TECHNOLOGIST PROVIDED HISTORY: episodes of AMS Reason for Exam: episodes of AMS Acuity: Unknown Type of Exam: Unknown FINDINGS: BRAIN/VENTRICLES: There is prominence of the ventricles and cortical sulci consistent with cortical volume loss. No midline shift. Basal cisterns are normally outlined. There is periventricular and subcortical white matter discrete and confluent low attenuation, nonspecific, likely representing age-related chronic small vessel ischemic disease. There is no evidence for acute infarct. No acute intra or extra-axial hemorrhage. ORBITS: The visualized portion of the orbits demonstrate no acute abnormality. SINUSES: The visualized paranasal sinuses and mastoid air cells demonstrate no acute abnormality. SOFT TISSUES/SKULL:  No acute abnormality of the visualized skull or soft tissues. No acute intracranial abnormality. Cerebral atrophy.      Ct Chest Wo Contrast    Result Date: 11/12/2020  EXAMINATION: CT OF THE CHEST WITHOUT CONTRAST, 11/12/2020 3:06 pm TECHNIQUE: CT of the chest was performed without the administration of intravenous contrast. Multiplanar reformatted images are provided for review. Dose modulation, iterative reconstruction, and/or weight based adjustment of the mA/kV was utilized to reduce the radiation dose to as low as reasonably achievable. COMPARISON: CT abdomen and pelvis dated 11/11/2020 HISTORY: ORDERING SYSTEM PROVIDED HISTORY: Nodules noted on abdominal CT scan. TECHNOLOGIST PROVIDED HISTORY: Nodules noted on abdominal CT scan. Reason for Exam: Nodules noted on abdominal CT scan Acuity: Unknown Type of Exam: Unknown FINDINGS: Mediastinum: The heart is mildly prominent in size. There is no pericardial effusion. Thoracic aorta and pulmonary arteries are normal in caliber. There is moderate coronary artery calcification. No mediastinal or hilar lymphadenopathy. Lungs/pleura: There are multiple bilateral pulmonary nodules. Several 0.3-0.4 cm nodules are seen in the apices. There is a 0.9 cm nodule in the superior segment right lower lobe, image 46. There is a mildly spiculated 1.1 x 1.2 cm nodule in the left upper lobe, image 32. There is a 1.3 cm nodule in the lingula, image 71. Several additional scattered 0.4-0.5 cm nodules are seen in both lungs. There are linear opacities in the lingula, right upper lobe, and right middle lobe, likely atelectasis and/or scarring. No pneumothorax or pleural effusion. There is mild scattered atelectasis. Upper Abdomen: Calcified splenic lesion is again noted, previously described on CT abdomen and pelvis dated 11/11/2020. No acute findings in the visualized upper abdomen. There is a small hiatal hernia. Soft Tissues/Bones: There is a small indeterminate sclerotic focus at the T8 vertebral body. There is a heterogeneous lesion in the sternum, favored to be a hemangioma. No other focal bony lesions. No acute osseous abnormality.      1. Multiple bilateral pulmonary nodules, measuring up to 1.2 11/11/2020     1. Multiple pulmonary nodules  2. Hypertension  3. History of breast cancer  T8 enhancing lesion      RECOMMENDATIONS:  1. I personally reviewed results of lab work-up imaging studies and other relevant clinical data. 2. Discussed with internal medicine team  3. Bone biopsy/T8 tomorrow prior  Patient continues to have nausea. GI recommended outpatient follow-up and EGD with EUS  Patient has history of breast cancer. Per patient she was treated with lumpectomy and radiation. Did not receive any chemotherapy or antihormone therapy       DVT prophylaxis  Continue supportive care primary        Thank you for asking us to see this patient.     Michelle Bell

## 2020-11-15 NOTE — PLAN OF CARE
Problem: Falls - Risk of:  Goal: Will remain free from falls  Description: Will remain free from falls  11/15/2020 1338 by Deanna Cook RN  Outcome: Ongoing  11/15/2020 0233 by Sean Cruz RN  Outcome: Ongoing     Problem: Safety:  Goal: Free from accidental physical injury  Description: Free from accidental physical injury  11/15/2020 1338 by Deanna Cook RN  Outcome: Ongoing  11/15/2020 0233 by Sean Cruz RN  Outcome: Ongoing

## 2020-11-16 ENCOUNTER — APPOINTMENT (OUTPATIENT)
Dept: MRI IMAGING | Age: 80
DRG: 439 | End: 2020-11-16
Payer: MEDICARE

## 2020-11-16 ENCOUNTER — APPOINTMENT (OUTPATIENT)
Dept: CT IMAGING | Age: 80
DRG: 439 | End: 2020-11-16
Payer: MEDICARE

## 2020-11-16 VITALS
SYSTOLIC BLOOD PRESSURE: 143 MMHG | HEIGHT: 60 IN | WEIGHT: 137.03 LBS | OXYGEN SATURATION: 98 % | RESPIRATION RATE: 16 BRPM | TEMPERATURE: 98 F | BODY MASS INDEX: 26.9 KG/M2 | DIASTOLIC BLOOD PRESSURE: 76 MMHG | HEART RATE: 68 BPM

## 2020-11-16 PROCEDURE — 6370000000 HC RX 637 (ALT 250 FOR IP): Performed by: GENERAL PRACTICE

## 2020-11-16 PROCEDURE — 6370000000 HC RX 637 (ALT 250 FOR IP): Performed by: STUDENT IN AN ORGANIZED HEALTH CARE EDUCATION/TRAINING PROGRAM

## 2020-11-16 PROCEDURE — A9576 INJ PROHANCE MULTIPACK: HCPCS | Performed by: STUDENT IN AN ORGANIZED HEALTH CARE EDUCATION/TRAINING PROGRAM

## 2020-11-16 PROCEDURE — 70450 CT HEAD/BRAIN W/O DYE: CPT

## 2020-11-16 PROCEDURE — 70553 MRI BRAIN STEM W/O & W/DYE: CPT

## 2020-11-16 PROCEDURE — 6360000004 HC RX CONTRAST MEDICATION: Performed by: STUDENT IN AN ORGANIZED HEALTH CARE EDUCATION/TRAINING PROGRAM

## 2020-11-16 PROCEDURE — 99232 SBSQ HOSP IP/OBS MODERATE 35: CPT | Performed by: INTERNAL MEDICINE

## 2020-11-16 PROCEDURE — 6360000002 HC RX W HCPCS: Performed by: STUDENT IN AN ORGANIZED HEALTH CARE EDUCATION/TRAINING PROGRAM

## 2020-11-16 PROCEDURE — 6370000000 HC RX 637 (ALT 250 FOR IP): Performed by: EMERGENCY MEDICINE

## 2020-11-16 RX ORDER — SODIUM CHLORIDE 0.9 % (FLUSH) 0.9 %
10 SYRINGE (ML) INJECTION PRN
Status: DISCONTINUED | OUTPATIENT
Start: 2020-11-16 | End: 2020-11-16 | Stop reason: HOSPADM

## 2020-11-16 RX ORDER — LORAZEPAM 2 MG/ML
0.5 INJECTION INTRAMUSCULAR
Status: COMPLETED | OUTPATIENT
Start: 2020-11-16 | End: 2020-11-16

## 2020-11-16 RX ORDER — PROMETHAZINE HYDROCHLORIDE 25 MG/1
25 TABLET ORAL EVERY 6 HOURS PRN
Qty: 28 TABLET | Refills: 0 | Status: SHIPPED | OUTPATIENT
Start: 2020-11-16 | End: 2020-11-23

## 2020-11-16 RX ORDER — SCOLOPAMINE TRANSDERMAL SYSTEM 1 MG/1
1 PATCH, EXTENDED RELEASE TRANSDERMAL
Qty: 3 PATCH | Refills: 0 | Status: SHIPPED | OUTPATIENT
Start: 2020-11-18 | End: 2020-11-25

## 2020-11-16 RX ADMIN — LORAZEPAM 0.5 MG: 2 INJECTION INTRAMUSCULAR; INTRAVENOUS at 07:42

## 2020-11-16 RX ADMIN — FLUOXETINE HYDROCHLORIDE 20 MG: 20 CAPSULE ORAL at 10:26

## 2020-11-16 RX ADMIN — GUAIFENESIN 600 MG: 600 TABLET, EXTENDED RELEASE ORAL at 10:26

## 2020-11-16 RX ADMIN — PANTOPRAZOLE SODIUM 40 MG: 40 TABLET, DELAYED RELEASE ORAL at 06:45

## 2020-11-16 RX ADMIN — LEVOTHYROXINE SODIUM 25 MCG: 25 TABLET ORAL at 06:45

## 2020-11-16 RX ADMIN — ACETAMINOPHEN 650 MG: 325 TABLET ORAL at 04:42

## 2020-11-16 RX ADMIN — DILTIAZEM HYDROCHLORIDE 120 MG: 120 CAPSULE, COATED, EXTENDED RELEASE ORAL at 10:25

## 2020-11-16 RX ADMIN — GADOTERIDOL 12 ML: 279.3 INJECTION, SOLUTION INTRAVENOUS at 08:21

## 2020-11-16 ASSESSMENT — PAIN SCALES - GENERAL
PAINLEVEL_OUTOF10: 0
PAINLEVEL_OUTOF10: 7

## 2020-11-16 NOTE — PROGRESS NOTES
Comprehensive Nutrition Assessment    Type and Reason for Visit:  Reassess    Nutrition Recommendations/Plan: Re-start General Diet as able. Re-start clear liquid oral nutrition supplement (Ensure Clear) 3x/d as able. Monitor/encourage intakes. Recommend nursing obtain actual body weight vs stated. Nutrition Assessment:  Patient with continued nausea and unable to tolerate much PO intake. Patient NPO this morning for bone biopsy. When diet able to be re-started, recommend General Diet with clear liquid oral nutrition supplement (Ensure Clear) 3x/d. Malnutrition Assessment:  Malnutrition Status: Moderate malnutrition    Context:  Acute Illness     Findings of the 6 clinical characteristics of malnutrition:  Energy Intake:  7 - 50% or less of estimated energy requirements for 5 or more days  Weight Loss:  9.7% x 1 month per pt report - no wt hx avaliable     Body Fat Loss:  1 - Mild body fat loss- Orbital   Muscle Mass Loss:  1 - Mild muscle mass loss- Hand (interosseous)  Fluid Accumulation:  No significant fluid accumulation     Strength:  Not Performed    Estimated Daily Nutrient Needs:  Energy (kcal):  22-25 kcal/kg = 2092-9817 kcals/day; Weight Used for Energy Requirements:  Admission     Protein (g):  1.2-1.5 gm/kg = 55-70 gm pro/day; Weight Used for Protein Requirements:  Ideal          Nutrition Related Findings:  Labs reviewed: K 3.3 (L). Meds reviewed. C/o nausea and abodminal pain. Wounds:  None       Current Nutrition Therapies:    DIET GENERAL; Anthropometric Measures:  · Height: 5' (152.4 cm)  · Current Body Weight: 140 lb (63.5 kg)   · Admission Body Weight: 140 lb (63.5 kg)    · Usual Body Weight: 155 lb (70.3 kg)(per pt)     · Ideal Body Weight: 100 lbs; % Ideal Body Weight 140 %   · BMI: 27.3  · BMI Categories: Overweight (BMI 25.0-29. 9)       Nutrition Diagnosis:   · Inadequate oral intake related to (nausea, abdominal pain, decreased appetite) as evidenced by poor intake prior to admission, weight loss, intake 0-25%, NPO or clear liquid status due to medical condition(need for ONS)    Nutrition Interventions:   Food and/or Nutrient Delivery:  Continue Current Diet, Continue Oral Nutrition Supplement  Nutrition Education/Counseling:  Education not indicated, No recommendation at this time   Coordination of Nutrition Care:  Continue to monitor while inpatient    Goals:  Oral intakes to meet % of estimated nutrition needs. Nutrition Monitoring and Evaluation:   Behavioral-Environmental Outcomes:  None Identified   Food/Nutrient Intake Outcomes:  Food and Nutrient Intake, Supplement Intake  Physical Signs/Symptoms Outcomes:  Biochemical Data, GI Status, Nausea or Vomiting, Nutrition Focused Physical Findings, Skin, Weight, Hemodynamic Status     Discharge Planning:     Too soon to determine     Electronically signed by Winnie Gusman RD, LD on 11/16/20 at 12:32 PM EST    Contact: [phone]

## 2020-11-16 NOTE — PROGRESS NOTES
CDU Daily Progress Note  Attending Physician       Pt Name: Alex Norton  MRN: [id number]  [place] 1940  Date of evaluation: 11/16/20    I performed a history and physical examination of the patient and discussed management with the resident. I reviewed the residents note and agree with the documented findings and plan of care. Any areas of disagreement are noted on the chart. I was personally present for the key portions of any procedures. I have documented in the chart those procedures where I was not present during the key portions. I have reviewed the emergency nurses triage note. I agree with the chief complaint, past medical history, past surgical history, allergies, medications, social and family history as documented unless otherwise noted below. Documentation of the HPI, Physical Exam and Medical Decision Making performed by medical students or scribes is based on my personal performance of the HPI, PE and MDM. For Physician Assistant/ Nurse Practitioner cases/documentation I have personally evaluated this patient and have completed at least one if not all key elements of the E/M (history, physical exam, and MDM). Additional findings are as noted. The Family History, Social History and Review of Systems are unchanged from the previous day. No significant events overnight. Nonsmoker. Not diabetic. Bone biopsy today for T8 enhancing lesion.     Kerby Lennox, MD  Attending Physician  Critical Decision Unit

## 2020-11-16 NOTE — PLAN OF CARE
Problem: Falls - Risk of:  Goal: Will remain free from falls  Description: Will remain free from falls  Outcome: Ongoing  Goal: Absence of physical injury  Description: Absence of physical injury  Outcome: Ongoing     Problem: Infection:  Goal: Will remain free from infection  Description: Will remain free from infection  Outcome: Ongoing     Problem: Safety:  Goal: Free from accidental physical injury  Description: Free from accidental physical injury  Outcome: Ongoing  Goal: Free from intentional harm  Description: Free from intentional harm  Outcome: Ongoing     Problem: Daily Care:  Goal: Daily care needs are met  Description: Daily care needs are met  Outcome: Ongoing     Problem: Pain:  Goal: Patient's pain/discomfort is manageable  Description: Patient's pain/discomfort is manageable  Outcome: Ongoing     Problem: Skin Integrity:  Goal: Skin integrity will stabilize  Description: Skin integrity will stabilize  Outcome: Ongoing     Problem: Discharge Planning:  Goal: Patients continuum of care needs are met  Description: Patients continuum of care needs are met  Outcome: Ongoing     Problem: Nutrition  Goal: Optimal nutrition therapy  Outcome: Ongoing     Problem: Skin Integrity:  Goal: Will show no infection signs and symptoms  Description: Will show no infection signs and symptoms  Outcome: Ongoing  Goal: Absence of new skin breakdown  Description: Absence of new skin breakdown  Outcome: Ongoing

## 2020-11-16 NOTE — PROGRESS NOTES
CT scan negative for acute intracranial abnormalities. Still pending confirmation of outpatient IR biopsy scheduled date and will DC once date is confirmed.      Electronically signed by Phil Cordova DO on 11/16/2020 at 3:02 PM

## 2020-11-16 NOTE — PROGRESS NOTES
Writer in room with patient,Patient walking in room to get in chair,patient moving quickly and attempts to sit on chair,but misses the chair and slides down to floor, her head hit the chair cushion next to her. Dr Kate Spencer in to examine patient,no obvious injury,vitals taken ,patient assisted back to bed without difficulty. Stat CT ordered.

## 2020-11-16 NOTE — PROGRESS NOTES
OBS/CDU   RESIDENT NOTE      Patients PCP is:  Adriana Restrepo        SUBJECTIVE      No acute events overnight. Has been able to tolerate a soft diet without  vomiting. The patient is urinating on his own and is passing flatus. Denies fever, chills, nausea, vomiting, chest pain, shortness of breath, abdominal pain, focal weakness, numbness, tingling, urinary/bowel symptoms, vision changes, visual hallucinations, or headache. PHYSICAL EXAM      General: NAD, AO X 3  Heent: EMOI, PERRL  Neck: SUPPLE, NO JVD  Cardiovascular: RRR, S1S2  Pulmonary: CTAB, NO SOB  Abdomen: SOFT, NTTP, ND, +BS  Extremities: +2/4 PULSES DISTAL, NO SWELLING  Neuro / Psych: NO NUMBNESS OR TINGLING, MENTATION AT BASELINE    PERTINENT TEST /EXAMS      I have reviewed all available laboratory results. MEDICATIONS CURRENT   sodium chloride flush 0.9 % injection 10 mL, PRN  scopolamine (TRANSDERM-SCOP) transdermal patch 1 patch, Q72H  guaiFENesin (MUCINEX) extended release tablet 600 mg, BID  dilTIAZem (CARDIZEM CD) extended release capsule 120 mg, Daily  apixaban (ELIQUIS) tablet 5 mg, BID  levothyroxine (SYNTHROID) tablet 25 mcg, Daily  promethazine (PHENERGAN) tablet 25 mg, Q6H PRN  sodium chloride flush 0.9 % injection 10 mL, PRN  sodium chloride flush 0.9 % injection 10 mL, 2 times per day  sodium chloride flush 0.9 % injection 10 mL, PRN  clonazePAM (KLONOPIN) tablet 1 mg, Nightly  FLUoxetine (PROZAC) capsule 20 mg, Daily  pantoprazole (PROTONIX) tablet 40 mg, QAM AC  acetaminophen (TYLENOL) tablet 650 mg, Q4H PRN  ondansetron (ZOFRAN) injection 4 mg, Q6H PRN  promethazine (PHENERGAN) injection 12.5 mg, Q6H PRN  melatonin tablet 6 mg, Nightly    And  melatonin tablet 4 mg, Nightly        All medication charted and reviewed.     CONSULTS      IP CONSULT TO GI  IP CONSULT TO IV TEAM  IP CONSULT TO ONCOLOGY  IP CONSULT TO NEUROSURGERY  IP CONSULT TO SPIRITUAL SERVICES  IP CONSULT TO HOME CARE NEEDS    ASSESSMENT/PLAN       Daiana Pepper Alexander is a 79 y.o. female who presents with nausea and vomiting and associated elevated lipase.     · Gastroenterology consulted - signed off. Outpatient follow up with Dr. Blanche Apley for EUS   · Creatinine trending down. · Lipase 259. Repeat this am trending down at 154. · CT head no acute abnormalities   · CT chest showing multiple pulmonary nodules, indeterminate sclerotic lesion in T8.   · Phenergan added for nausea control. · Patient has been unable to tolerate oral intake.   · Neurosurgery signed off. MRI showing lucency of T8 vertebral body. · MRI of T-spine showing enhancing focus in the T8 vertebral body consistent with possible metastatic lesion. Oncology stating may need bone biopsy. · IR for bone biopsy today pended due to eliquis. Scheduled as outpatient   · Oncology consulted for pulmonary nodules.   Patient has history of pulmonary nodules on CT.  They are recommending outpatient follow-up with hematology and oncology with repeat CT in 6 months.        · Continue home medications and pain control  · Monitor vitals, labs, and imaging  · DISPO: pending consults and clinical improvement    --  Adarsh Marcus  Emergency Medicine Resident Physician     This dictation was generated by voice recognition computer software. Although all attempts are made to edit the dictation for accuracy, there may be errors in the transcription that are not intended.

## 2020-11-16 NOTE — PROGRESS NOTES
loss: 9.7% X 1 month per patient report; no weight history available  Mild body fat loss, Orbital  Mild muscle mass loss; Hand  Treatment: Dietitian evaluation, Clear oral supplements, IV Zofran, IV   Phenergan, IV fluids    Thank you, Stefanie Olmstead RN, CDS. Please call with any questions [phone]    M-F 6a-2:30p  Options provided:  -- Malnutrition moderate  -- Protein calorie malnutrition moderate  -- Other - I will add my own diagnosis  -- Disagree - Not applicable / Not valid  -- Disagree - Clinically unable to determine / Unknown  -- Refer to Clinical Documentation Reviewer    PROVIDER RESPONSE TEXT:    This patient has moderate malnutrition.     Query created by: Mary Ann Lemus on 11/16/2020 8:52 AM      Electronically signed by:  Kristin Bonner DO 11/16/2020 12:53 PM

## 2020-11-16 NOTE — PROGRESS NOTES
Today's Date: 2020  Patient Name: Wilmer Knight  Date of admission: 2020 12:16 PM  Patient's age: 79 y. o., 1940  Admission Dx: Acute recurrent pancreatitis [K85.90]  Acute recurrent pancreatitis [K85.90]    Reason for Consult: management recommendations  Requesting Physician: Miguel A Shah MD    CHIEF COMPLAINT: Nausea and vomiting    History Obtained From:  patient, electronic medical record    Interval history:    Patient seen and examined. Labs and vitals reviewed  Nausea is improved. Colon biopsy postponed as patient is on anticoagulation  Anticipating discharge today. Denies fever chills      HISTORY OF PRESENT ILLNESS:      The patient is a 79 y.o.  female with past medical history significant for breast cancer 21 years ago s/p right sided lumpectomy, radiation, hypertension admitted with nausea and emesis since the past 1 month. Patient reported that she has been feeling sick since the past 1 month, admits to having chills, night sweats, weight loss of 10 to 15 pounds in the past 2 months, loss of appetite. Recent EGD which was done at [facility] showed gastritis, all GI work-up unremarkable so far. CT chest showed multiple bilateral pulmonary nodules, 1.2 cm left upper lobe, 1.3 cm lingula. Patient had multiple CT scans done at [address] [place] [address] which showed pulmonary nodules. Hematology and oncology consulted for pulmonary nodules. Patient seen and examined. Denies any chest pain, worsening shortness of breath, fevers. Has chronic dry cough. Hemodynamically stable. Denies any smoking or alcohol intake. Past Medical History:   has a past medical history of Anxiety, Arthritis, Diarrhea, Hyperlipidemia, Hypertension, and Lymphocytic colitis. Past Surgical History:   has a past surgical history that includes Hysterectomy;   section; Breast lumpectomy (Right); pr egd transoral biopsy single/multiple (N/A, 3/7/2018); and pr colonoscopy w/biopsy single/multiple (N/A, mg Oral Q6H PRN Thornell Reef, DO   25 mg at 11/15/20 1826    sodium chloride flush 0.9 % injection 10 mL  10 mL Intravenous PRN JAQUAN Love - NP        sodium chloride flush 0.9 % injection 10 mL  10 mL Intravenous 2 times per day Enoc LINDSAY Paulino, DO        sodium chloride flush 0.9 % injection 10 mL  10 mL Intravenous PRN Kerrie Officer Paulino, DO        lactated ringers infusion   Intravenous Continuous JAQUAN Pedersen -  mL/hr at 11/13/20 2143      clonazePAM (KLONOPIN) tablet 1 mg  1 mg Oral Nightly Kerrie Officer Paulino, DO   1 mg at 11/15/20 1948    FLUoxetine (PROZAC) capsule 20 mg  20 mg Oral Daily Enoc LINDSAY Paulino, DO   20 mg at 11/15/20 0920    pantoprazole (PROTONIX) tablet 40 mg  40 mg Oral QAM AC Enoc LINDSAY Paulino, DO   40 mg at 11/16/20 0645    acetaminophen (TYLENOL) tablet 650 mg  650 mg Oral Q4H PRN Kerrie Officer Paulino, DO   650 mg at 11/16/20 0442    ondansetron (ZOFRAN) injection 4 mg  4 mg Intravenous Q6H PRN Thornell Reef, DO   4 mg at 11/15/20 0647    promethazine (PHENERGAN) injection 12.5 mg  12.5 mg Intravenous Q6H PRN Mario Apley Alexander, DO   12.5 mg at 11/15/20 9085    melatonin tablet 6 mg  6 mg Oral Nightly Natha Setting, DO   6 mg at 11/15/20 1948    And    melatonin tablet 4 mg  4 mg Oral Nightly Natha Setting, DO   4 mg at 11/15/20 1949       Allergies:  Patient has no known allergies. Social History:   reports that she has never smoked. She has never used smokeless tobacco. She reports that she does not drink alcohol or use drugs. Family History: family history is not on file.     REVIEW OF SYSTEMS:      Constitutional: + night sweats, +weight loss   Eyes: No eye discharge, double vision, or eye pain   HEENT: negative for sore mouth, sore throat, hoarseness and voice change   Respiratory: +dry cough , no sputum, dyspnea, wheezing, hemoptysis, chest pain   Cardiovascular: negative for chest pain, dyspnea, palpitations, orthopnea, PND   Gastrointestinal: +nausea,+ vomiting,no  diarrhea, constipation, abdominal pain, Dysphagia, hematemesis and hematochezia   Genitourinary: negative for frequency, dysuria, nocturia, urinary incontinence, and hematuria   Integument: negative for rash, skin lesions, bruises.    Hematologic/Lymphatic: negative for easy bruising, bleeding, lymphadenopathy, or petechiae   Endocrine: negative for heat or cold intolerance,weight changes, change in bowel habits and hair loss   Musculoskeletal: negative for myalgias, arthralgias, pain, joint swelling,and bone pain   Neurological: negative for headaches, dizziness, seizures, weakness, numbness    PHYSICAL EXAM:        /70   Pulse 78   Temp 98.2 °F (36.8 °C) (Oral)   Resp 15   Ht 5' (1.524 m)   Wt 140 lb (63.5 kg)   SpO2 95%   BMI 27.34 kg/m²    Temp (24hrs), Av.1 °F (36.7 °C), Min:97.9 °F (36.6 °C), Max:98.2 °F (36.8 °C)      General appearance - well appearing, in no distress   Mental status - alert and cooperative    Mouth - mucous membranes moist, pharynx normal without lesions   Neck - supple, no significant adenopathy   Lymphatics - no palpable lymphadenopathy, no hepatosplenomegaly   Chest - clear to auscultation, no wheezes, rales or rhonchi, symmetric air entry   Heart - normal rate, regular rhythm, normal S1, S2, no murmurs  Abdomen - soft, nontender, nondistended, no masses or organomegaly   Neurological - alert, oriented, normal speech, no focal findings or movement disorder noted   Musculoskeletal - no joint tenderness, deformity or swelling   Extremities - peripheral pulses normal, no pedal edema, no clubbing or cyanosis   Skin - normal coloration and turgor, no rashes, no suspicious skin lesions noted ,      DATA:      Labs:     Results for orders placed or performed during the hospital encounter of 20   CBC   Result Value Ref Range    WBC 6.9 3.5 - 11.3 k/uL    RBC 5.18 (H) 3.95 - 5.11 m/uL    Hemoglobin 13.6 11.9 - 15.1 g/dL    Hematocrit 43.3 36.3 - 47.1 %    MCV 83.6 82.6 - 102.9 fL    MCH 26.3 25.2 - 33.5 pg    MCHC 31.4 28.4 - 34.8 g/dL    RDW 16.9 (H) 11.8 - 14.4 %    Platelets 075 617 - 410 k/uL    MPV 11.7 8.1 - 13.5 fL    NRBC Automated 0.0 0.0 per 100 WBC   Basic Metabolic Panel   Result Value Ref Range    Glucose 109 (H) 70 - 99 mg/dL    BUN 14 8 - 23 mg/dL    CREATININE 1.33 (H) 0.50 - 0.90 mg/dL    Bun/Cre Ratio NOT REPORTED 9 - 20    Calcium 9.3 8.6 - 10.4 mg/dL    Sodium 135 135 - 144 mmol/L    Potassium 3.8 3.7 - 5.3 mmol/L    Chloride 99 98 - 107 mmol/L    CO2 22 20 - 31 mmol/L    Anion Gap 14 9 - 17 mmol/L    GFR Non-African American 38 (L) >60 mL/min    GFR  46 (L) >60 mL/min    GFR Comment          GFR Staging NOT REPORTED    LIPASE   Result Value Ref Range    Lipase 233 (H) 13 - 60 U/L   Hepatic Function Panel   Result Value Ref Range    Alb 3.7 3.5 - 5.2 g/dL    Alkaline Phosphatase 91 35 - 104 U/L    ALT 8 5 - 33 U/L    AST 14 <32 U/L    Total Bilirubin 0.41 0.3 - 1.2 mg/dL    Bilirubin, Direct 0.18 <0.31 mg/dL    Bilirubin, Indirect 0.23 0.00 - 1.00 mg/dL    Total Protein 6.8 6.4 - 8.3 g/dL    Globulin NOT REPORTED 1.5 - 3.8 g/dL    Albumin/Globulin Ratio 1.2 1.0 - 2.5   URINALYSIS   Result Value Ref Range    Color, UA DARK YELLOW (A) YELLOW    Turbidity UA CLOUDY (A) CLEAR    Glucose, Ur NEGATIVE NEGATIVE    Bilirubin Urine NEGATIVE NEGATIVE    Ketones, Urine SMALL (A) NEGATIVE    Specific Gravity, UA 1.027 1.005 - 1.030    Urine Hgb TRACE (A) NEGATIVE    pH, UA 5.0 5.0 - 8.0    Protein, UA 4+ (A) NEGATIVE    Urobilinogen, Urine Normal Normal    Nitrite, Urine NEGATIVE NEGATIVE    Leukocyte Esterase, Urine TRACE (A) NEGATIVE    Urinalysis Comments NOT REPORTED    Troponin   Result Value Ref Range    Troponin, High Sensitivity 24 (H) 0 - 14 ng/L    Troponin T NOT REPORTED <0.03 ng/mL    Troponin Interp NOT REPORTED    Microscopic Urinalysis   Result Value Ref Range    -          WBC, UA 50  0 - 5 /HPF    RBC, UA 2 TO 5 0 - 4 /HPF Casts UA  0 - 8 /LPF     10 TO 20 HYALINE Reference range defined for non-centrifuged specimen. Crystals, UA NOT REPORTED None /HPF    Epithelial Cells UA 5 TO 10 0 - 5 /HPF    Renal Epithelial, UA NOT REPORTED 0 /HPF    Bacteria, UA NOT REPORTED None    Mucus, UA NOT REPORTED None    Trichomonas, UA NOT REPORTED None    Amorphous, UA NOT REPORTED None    Other Observations UA NOT REPORTED NOT REQ.     Yeast, UA NOT REPORTED None   Troponin   Result Value Ref Range    Troponin, High Sensitivity 21 (H) 0 - 14 ng/L    Troponin T NOT REPORTED <0.03 ng/mL    Troponin Interp NOT REPORTED    LIPASE   Result Value Ref Range    Lipase 259 (H) 13 - 60 U/L   Basic Metabolic Panel   Result Value Ref Range    Glucose 104 (H) 70 - 99 mg/dL    BUN 6 (L) 8 - 23 mg/dL    CREATININE 1.09 (H) 0.50 - 0.90 mg/dL    Bun/Cre Ratio NOT REPORTED 9 - 20    Calcium 8.4 (L) 8.6 - 10.4 mg/dL    Sodium 139 135 - 144 mmol/L    Potassium 3.2 (L) 3.7 - 5.3 mmol/L    Chloride 106 98 - 107 mmol/L    CO2 22 20 - 31 mmol/L    Anion Gap 11 9 - 17 mmol/L    GFR Non-African American 48 (L) >60 mL/min    GFR  58 (L) >60 mL/min    GFR Comment          GFR Staging NOT REPORTED    COMPREHENSIVE METABOLIC PANEL   Result Value Ref Range    Glucose 114 (H) 70 - 99 mg/dL    BUN 5 (L) 8 - 23 mg/dL    CREATININE 1.02 (H) 0.50 - 0.90 mg/dL    Bun/Cre Ratio NOT REPORTED 9 - 20    Calcium 8.7 8.6 - 10.4 mg/dL    Sodium 140 135 - 144 mmol/L    Potassium 3.3 (L) 3.7 - 5.3 mmol/L    Chloride 106 98 - 107 mmol/L    CO2 22 20 - 31 mmol/L    Anion Gap 12 9 - 17 mmol/L    Alkaline Phosphatase 71 35 - 104 U/L    ALT <5 (L) 5 - 33 U/L    AST 12 <32 U/L    Total Bilirubin 0.38 0.3 - 1.2 mg/dL    Total Protein 5.7 (L) 6.4 - 8.3 g/dL    Alb 3.0 (L) 3.5 - 5.2 g/dL    Albumin/Globulin Ratio 1.1 1.0 - 2.5    GFR Non-African American 52 (L) >60 mL/min    GFR African American >60 >60 mL/min    GFR Comment          GFR Staging NOT REPORTED    LIPASE   Result Value Ref Range    Lipase 154 (H) 13 - 60 U/L   EKG 12 Lead   Result Value Ref Range    Ventricular Rate 80 BPM    Atrial Rate 80 BPM    P-R Interval 140 ms    QRS Duration 84 ms    Q-T Interval 412 ms    QTc Calculation (Bazett) 475 ms    P Axis 88 degrees    R Axis 24 degrees    T Axis 51 degrees         IMAGING DATA:    Ct Abdomen Pelvis Wo Contrast Additional Contrast? None    Result Date: 11/11/2020  EXAMINATION: CT OF THE ABDOMEN AND PELVIS WITHOUT CONTRAST 11/11/2020 3:40 pm TECHNIQUE: CT of the abdomen and pelvis was performed without the administration of intravenous contrast. Multiplanar reformatted images are provided for review. Dose modulation, iterative reconstruction, and/or weight based adjustment of the mA/kV was utilized to reduce the radiation dose to as low as reasonably achievable. COMPARISON: None. HISTORY: ORDERING SYSTEM PROVIDED HISTORY: elevated lipase TECHNOLOGIST PROVIDED HISTORY: elevated lipase Reason for Exam: elevated lipase Type of Exam: Unknown FINDINGS: Lower Chest: Linear scarring/atelectasis in the right upper and middle lobes. There are multiple bilateral noncalcified pulmonary nodules measuring up to 1.3 cm in the lingula. Partially calcified left upper lobe nodule measures 1 cm in is partially visualized. Coronary artery atherosclerosis. Organs: Within the limitations of a noncontrast examination, no acute abnormality within the liver, gallbladder, pancreas, adrenal glands, or kidneys. There is a 5.1 x 5.5 cm hyperattenuating lesion within the spleen with rim calcification. GI/Bowel: Moderate hiatal hernia. Stomach is nondistended. The small bowel is nondilated. The colon is normal in caliber with a few scattered, noninflamed diverticula. Pelvis: Bladder is nondistended. No vesicular stone. Prior hysterectomy. Peritoneum/Retroperitoneum: No ascites or pneumoperitoneum. Atherosclerosis of the nondilated abdominal aorta.  Bones/Soft Tissues: Multilevel degenerative changes of the lumbar spine. No acute osseous abnormality. Degenerative changes within the left hip. 1. No acute intra-abdominal abnormality. 2. 5.5 cm hyperattenuating splenic lesion with peripheral calcification is indeterminate. Multiphase MRI or CT may be useful for further characterization. 3. Bilateral pulmonary nodules measuring up to 1.3 cm. RECOMMENDATIONS: Guidelines for follow-up and management of pulmonary nodules found on incomplete chest CT: >8mm - immediate chest CT for further evaluation. Reference:  Radiology. 2017 Jul; 284(1):228-243     Ct Head Wo Contrast    Result Date: 11/12/2020  EXAMINATION: CT OF THE HEAD WITHOUT CONTRAST  11/12/2020 3:06 pm TECHNIQUE: CT of the head was performed without the administration of intravenous contrast. Dose modulation, iterative reconstruction, and/or weight based adjustment of the mA/kV was utilized to reduce the radiation dose to as low as reasonably achievable. COMPARISON: None. HISTORY: ORDERING SYSTEM PROVIDED HISTORY: episodes of AMS TECHNOLOGIST PROVIDED HISTORY: episodes of AMS Reason for Exam: episodes of AMS Acuity: Unknown Type of Exam: Unknown FINDINGS: BRAIN/VENTRICLES: There is prominence of the ventricles and cortical sulci consistent with cortical volume loss. No midline shift. Basal cisterns are normally outlined. There is periventricular and subcortical white matter discrete and confluent low attenuation, nonspecific, likely representing age-related chronic small vessel ischemic disease. There is no evidence for acute infarct. No acute intra or extra-axial hemorrhage. ORBITS: The visualized portion of the orbits demonstrate no acute abnormality. SINUSES: The visualized paranasal sinuses and mastoid air cells demonstrate no acute abnormality. SOFT TISSUES/SKULL:  No acute abnormality of the visualized skull or soft tissues. No acute intracranial abnormality. Cerebral atrophy.      Ct Chest Wo Contrast    Result Date: 11/12/2020  EXAMINATION: CT OF THE CHEST WITHOUT CONTRAST, 11/12/2020 3:06 pm TECHNIQUE: CT of the chest was performed without the administration of intravenous contrast. Multiplanar reformatted images are provided for review. Dose modulation, iterative reconstruction, and/or weight based adjustment of the mA/kV was utilized to reduce the radiation dose to as low as reasonably achievable. COMPARISON: CT abdomen and pelvis dated 11/11/2020 HISTORY: ORDERING SYSTEM PROVIDED HISTORY: Nodules noted on abdominal CT scan. TECHNOLOGIST PROVIDED HISTORY: Nodules noted on abdominal CT scan. Reason for Exam: Nodules noted on abdominal CT scan Acuity: Unknown Type of Exam: Unknown FINDINGS: Mediastinum: The heart is mildly prominent in size. There is no pericardial effusion. Thoracic aorta and pulmonary arteries are normal in caliber. There is moderate coronary artery calcification. No mediastinal or hilar lymphadenopathy. Lungs/pleura: There are multiple bilateral pulmonary nodules. Several 0.3-0.4 cm nodules are seen in the apices. There is a 0.9 cm nodule in the superior segment right lower lobe, image 46. There is a mildly spiculated 1.1 x 1.2 cm nodule in the left upper lobe, image 32. There is a 1.3 cm nodule in the lingula, image 71. Several additional scattered 0.4-0.5 cm nodules are seen in both lungs. There are linear opacities in the lingula, right upper lobe, and right middle lobe, likely atelectasis and/or scarring. No pneumothorax or pleural effusion. There is mild scattered atelectasis. Upper Abdomen: Calcified splenic lesion is again noted, previously described on CT abdomen and pelvis dated 11/11/2020. No acute findings in the visualized upper abdomen. There is a small hiatal hernia. Soft Tissues/Bones: There is a small indeterminate sclerotic focus at the T8 vertebral body. There is a heterogeneous lesion in the sternum, favored to be a hemangioma. No other focal bony lesions. No acute osseous abnormality.      1. Multiple bilateral pulmonary nodules, measuring up to 1.2 cm in the left upper lobe and 1.3 cm in the lingula. Follow-up CT chest recommended in 3-6 months. Alternatively, if clinically indicated, further evaluation with PET/CT or tissue sampling could be considered. 2. Redemonstration of the calcified splenic lesion, previously described in the CT abdomen and pelvis report dated 11/11/2020. 3. Small hiatal hernia. 4. Small indeterminate sclerotic focus in the T8 vertebral body. This may be a benign hemangioma, but if the patient has history of malignancy, metastatic focus could be considered. Mri Thoracic Spine W Wo Contrast    Result Date: 11/13/2020  EXAMINATION: MRI OF THE THORACIC SPINE WITHOUT AND WITH CONTRAST  11/13/2020 2:59 pm TECHNIQUE: Multiplanar multisequence MRI of the thoracic spine was performed without and with the administration of intravenous contrast. COMPARISON: None HISTORY: ORDERING SYSTEM PROVIDED HISTORY: T8 metastasis TECHNOLOGIST PROVIDED HISTORY: T8 metastasis Reason for Exam: t8 metastasis FINDINGS: BONES/ALIGNMENT: There is a normal thoracic kyphosis. The vertebral body heights are maintained. There is a T1/T2 hypointense focus in the T8 vertebral body that demonstrates peripheral edema and homogeneous enhancement. Overall this measures approximately 1.2 x 1.3 x 1.2 cm. There is no spondylolisthesis. SPINAL CORD: The spinal cord is normal in caliber and signal. SOFT TISSUES:  Posterior paraspinal soft tissues are unremarkable. Visualized thoracic and upper abdominal soft tissues are unremarkable DEGENERATIVE CHANGES: There is multilevel degenerative disc disease with loss of disc signal.  There is no disc space narrowing. There is no canal stenosis or foraminal narrowing. Enhancing focus in the T8 vertebral body consistent with patient's history a metastatic lesion.          IMPRESSION:   Primary Problem  <principal problem not specified>    Active Hospital Problems    Diagnosis Date Noted    Acute pancreatitis [K85.90] 11/11/2020     1. Multiple pulmonary nodules  2. Hypertension  3. History of breast cancer  T8 enhancing lesion      RECOMMENDATIONS:  1. I personally reviewed results of lab work-up imaging studies and other relevant clinical data. 2. Discussed with internal medicine team  3. Bone biopsy postponed as patient is on anticoagulation  4. Nausea is improved. Outpatient follow-up with GI.  5. Anticipating discharge today. Outpatient work-up in [facility]  Continue supportive care primary        Thank you for asking us to see this patient.     Asael Lezama

## 2020-11-16 NOTE — DISCHARGE INSTR - COC
Continuity of Care Form    Patient Name: Kate Manzanares   :  1940  MRN:  [id number]    Admit date:  2020  Discharge date:  ***    Code Status Order: Full Code   Advance Directives:   Advance Care Flowsheet Documentation       Date/Time Healthcare Directive Type of Healthcare Directive Copy in [address] Agent's Name Healthcare Agent's Phone Number    [phone]  Yes, patient has an advance directive for healthcare treatment  Durable power of  for health care  --  --  --  --            Admitting Physician:  Galina Vee MD  PCP: Echo Zapata    Discharging Nurse: [facility] Unit/Room#: 0350/0350-01  Discharging Unit Phone Number: ***    Emergency Contact:   Extended Emergency Contact Information  Primary Emergency Contact: Joyceann Lanes   [facility] [address] Phone: [phone]  Relation: Child  Secondary Emergency Contact: [facility] [address] Phone: [phone]  Relation: Child    Past Surgical History:  Past Surgical History:   Procedure Laterality Date    BREAST LUMPECTOMY Right      SECTION      x 3. HYSTERECTOMY      [facility] COLONOSCOPY W/BIOPSY SINGLE/MULTIPLE N/A 3/7/2018    COLONOSCOPY WITH BIOPSY performed by Amie Phalen, MD at [facility]    [facility] EGD TRANSORAL BIOPSY SINGLE/MULTIPLE N/A 3/7/2018    EGD BIOPSY performed by Amie Phalen, MD at [facility]       Immunization History: There is no immunization history on file for this patient.     Active Problems:  Patient Active Problem List   Diagnosis Code    Elevated serum creatinine R79.89    Lymphocytic colitis K52.832    Diarrhea R19.7    Acute pancreatitis K85.90       Isolation/Infection:   Isolation            No Isolation          Patient Infection Status       None to display            Nurse Assessment:  Last Vital Signs: /73   Pulse 68   Temp 98 °F (36.7 °C) (Oral)   Resp 18   Ht 5' (1.524 m)   Wt 140 lb (63.5 kg)   SpO2 98%   BMI 27.34 kg/m²     Last documented pain score (0-10 scale): Pain Level: 7  Last Weight:   Wt Readings from Last 1 Encounters:   20 140 lb (63.5 kg)     Mental Status:  {IP PT MENTAL STATUS::::0}    IV Access:  [address] IV ACCESS:[id number]:::0}    Nursing Mobility/ADLs:  Walking   {CHP DME ADLs:256106195:::0}  Transfer  {CHP DME ADLs:995702521:::0}  Bathing  {CHP DME ADLs:761771477:::0}  Dressing  {CHP DME ADLs:642516654:::0}  Toileting  {CHP DME ADLs:383747935:::0}  Feeding  {CHP DME ADLs:287173136:::0}  Med Admin  {CHP DME ADLs:759866532:::0}  Med Delivery   { ERIBERTO MED Delivery:100141744:::0}    Wound Care Documentation and Therapy:        Elimination:  Continence: Bowel: {YES / YV:70662}  Bladder: {YES / NF:94226}  Urinary Catheter: {Urinary Catheter:282144523:::0}   Colostomy/Ileostomy/Ileal Conduit: {YES / LI:20454}       Date of Last BM: ***  No intake or output data in the 24 hours ending 20 1108  No intake/output data recorded.     Safety Concerns:     [address] Safety Concerns:797964183:::0}    Impairments/Disabilities:      [address] Impairments/Disabilities:115943810:::0}    Nutrition Therapy:  Current Nutrition Therapy:   [address] Diet List:713287036:::0}    Routes of Feeding: {CHP DME Other Feedings:592235821:::0}  Liquids: {Slp liquid thickness:72602}  Daily Fluid Restriction: {CHP DME Yes amt example:411600593:::0}  Last Modified Barium Swallow with Video (Video Swallowing Test): {Done Not Done PFBC:518201386:::7}    Treatments at the Time of Hospital Discharge:   Respiratory Treatments: ***  Oxygen Therapy:  {Therapy; copd oxygen:52495:::0}  Ventilator:    { JENIFER Vent List:221124524:::0}    Rehab Therapies: {THERAPEUTIC INTERVENTION:1870952571}  Weight Bearing Status/Restrictions: [address] BURGESS Weight Bearin:::0}  Other Medical Equipment (for information only, NOT a DME order):  {EQUIPMENT:883768735}  Other Treatments: ***    Patient's personal belongings (please select all that are sent with patient):  {CHP DME Belongings:122246399:::0}    RN SIGNATURE:  {Esignature:323065148:::0}    CASE MANAGEMENT/SOCIAL WORK SECTION    Inpatient Status Date: ***    Readmission Risk Assessment Score:  Readmission Risk              Risk of Unplanned Readmission:        11           Discharging to Facility/ Agency   Name:   Address:  Phone:  Fax:    Dialysis Facility (if applicable)   Name:  Address:  Dialysis Schedule:  Phone:  Fax:    / signature: {Esignature:059449474:::0}    PHYSICIAN SECTION    Prognosis: Good    Condition at Discharge: Stable    Rehab Potential (if transferring to Rehab): Good    Recommended Labs or Other Treatments After Discharge:     Physician Certification: I certify the above information and transfer of Lexi Everett  is necessary for the continuing treatment of the diagnosis listed and that she requires Home Care for less 30 days.      Update Admission H&P: No change in H&P    PHYSICIAN SIGNATURE:  Electronically signed by Hunter Pavon MD on 11/16/20 at 11:09 AM EST

## 2020-11-16 NOTE — PROGRESS NOTES
Discharge instructions reviewed with patient and her daughter. Copy of discharge instructions,prescriptions and appointments given to patient. Discharge from unit per wheelchair with daughter.

## 2020-11-17 ENCOUNTER — TELEPHONE (OUTPATIENT)
Dept: INTERVENTIONAL RADIOLOGY/VASCULAR | Age: 80
End: 2020-11-17

## 2020-11-17 NOTE — ADT AUTH CERT
Pancreatitis - Care Day 3 (11/13/2020) by Andreia Servin RN         Review Status  Review Entered    Completed  11/16/2020 11:58        Criteria Review       Care Day: 3 Care Date: 11/13/2020 Level of Care:    Guideline Day 1    Level Of Care    (X) ICU [D] or floor    11/16/2020 11:58 AM EST by Chip Fish      Med Surg  Telemetry    Clinical Status    ( ) * Clinical Indications met [E]    (X) Pain, fever, or vomiting    Routes    (X) NPO, enteral, or oral feeds [F]    (X) IV fluids and medications    11/16/2020 11:58 AM EST by Margaret Robbins      Ativan 0.5 mg IV x 1   ml/hr  Zofran 4 mg IV prn x 1    Interventions    (X) Abdominal imaging    (X) CXR, ECG    (X) Laboratory tests    (X) Possible oxygen and pulse oximetry    * Milestone    Additional Notes    11/13/2020       Pulmonology       CHIEF COMPLAINT/REASON FOR CONSULT:    Mutiple pulmonary nodules            Assessment:         //Multiple pulmonary nodules    //Acute recurrent pancreatitis    //Breast cancer in past    //Small T8 indeterminate sclerotic focus    //Hypertension         Plan:         I personally interviewed/examined the patient; reviewed interval history, interpreted all available radiographic and laboratory data at the time of service.         -Patient remains hemodynamically stable and is currently saturating well on room air    -Continued on supportive care per primary    -Requested CT chest images from [facility]    -Evaluated by neurosurgery for T8 sclerotic focus.  Follow-up MRI spine.    -Patient stable to be discharged from pulmonology standpoint    -Outpatient follow-up with pulmonology    --    Continue to monitor I/O with a goal of even/negative fluid balance    DVT and stress ulcer prophylaxis    Physical/occupational therapy; increase activity as tolerated.            Neurosurgery       Current Facility-Administered Medications: promethazine (PHENERGAN) tablet 25 mg, 25 mg, Oral, Q6H PRN    sodium chloride flush 0.9 % injection 10 mL, 10 mL, Intravenous, 2 times per day    sodium chloride flush 0.9 % injection 10 mL, 10 mL, Intravenous, PRN    lactated ringers infusion, , Intravenous, Continuous    amLODIPine (NORVASC) tablet 5 mg, 5 mg, Oral, Nightly    clonazePAM (KLONOPIN) tablet 1 mg, 1 mg, Oral, Nightly    FLUoxetine (PROZAC) capsule 20 mg, 20 mg, Oral, Daily    pantoprazole (PROTONIX) tablet 40 mg, 40 mg, Oral, QAM AC    acetaminophen (TYLENOL) tablet 650 mg, 650 mg, Oral, Q4H PRN    enoxaparin (LOVENOX) injection 30 mg, 30 mg, Subcutaneous, Daily    ondansetron (ZOFRAN) injection 4 mg, 4 mg, Intravenous, Q6H PRN    promethazine (PHENERGAN) injection 12.5 mg, 12.5 mg, Intravenous, Q6H PRN    melatonin tablet 6 mg, 6 mg, Oral, Nightly **AND** melatonin tablet 4 mg, 4 mg, Oral, Nightly            /72   Pulse 83   Temp 98.8 °F (37.1 °C) (Oral)   Resp 18   Ht 5' (1.524 m)   Wt 140 lb (63.5 kg)   SpO2 98%   BMI 27.34 kg/m²       ASSESSMENT AND PLAN:              Patient Active Problem List    Diagnosis    · Elevated serum creatinine    · Lymphocytic colitis    · Diarrhea    · Acute recurrent pancreatitis                   A/P:  This is a 80 y.o. female with persistent nausea over 1-2 months     Neurosurgery consulted for T8 small indeterminate sclerotic focus     Patient care will be discussed with attending, will reevaluated patient along with attending.          - MRI thoracic spine     - will need Hematology oncology consult for lung nodules    - ok for [facility]       Internal Medicine       No acute events overnight. Continues to be nauseated. CT scan showing T8 lucency will consult oncology and neurosurgery          The patient is urinating on his own and is passing flatus.  Denies fever, chills, nausea, vomiting, chest pain, shortness of breath, abdominal pain, focal weakness, numbness, tingling, urinary/bowel symptoms, vision changes, visual hallucinations, or headache.            ASSESSMENT/PLAN        with herbal supplements, CBD, and THC.  With patient's pain, appetite, sleeping.  Previous notes also identified that patient's daughter feels that she has been acting more confused.  Lipase was performed in the emergency department which identified an elevation.         Location/Symptom: Persistent nausea and vomiting    Timing/Onset: 1 and 1/2 months    Provocation: Unknown    Quality: No pain    Radiation: Nonradiating    Severity: Not applicable    Timing/Duration: Persistent    Modifying Factors: Unknown         REVIEW OF SYSTEMS           Review of Systems    Constitutional: Positive for activity change, appetite change and fatigue. Negative for chills and fever. HENT: Negative for congestion and sore throat.      Respiratory: Negative for apnea, chest tightness and shortness of breath.      Cardiovascular: Negative for chest pain and leg swelling. Gastrointestinal: Positive for nausea and vomiting. Negative for abdominal distention, abdominal pain, constipation and diarrhea. Genitourinary: Negative for difficulty urinating and dysuria. Musculoskeletal: Negative for back pain. Skin: Negative for color change. Neurological: Negative for dizziness, weakness, light-headedness, numbness and headaches. Psychiatric/Behavioral: Positive for confusion.  Negative for agitation, behavioral problems and suicidal ideas.             PAST MEDICAL HISTORY     has a past medical history of Anxiety, Arthritis, Diarrhea, Hyperlipidemia, Hypertension, and Lymphocytic colitis.            sodium chloride flush 0.9 % injection 10 mL, 2 times per day    sodium chloride flush 0.9 % injection 10 mL, PRN    amLODIPine (NORVASC) tablet 5 mg, Nightly    clonazePAM (KLONOPIN) tablet 1 mg, Nightly    FLUoxetine (PROZAC) capsule 20 mg, Daily    pantoprazole (PROTONIX) tablet 40 mg, QAM AC    acetaminophen (TYLENOL) tablet 650 mg, Q4H PRN    enoxaparin (LOVENOX) injection 30 mg, Daily    dextrose 5 % and 0.45 % sodium chloride infusion, Continuous    ondansetron (ZOFRAN) injection 4 mg, Q6H PRN    promethazine (PHENERGAN) injection 12.5 mg, Q6H PRN    melatonin tablet 6 mg, Nightly      And    melatonin tablet 4 mg, Nightly            PHYSICAL EXAM      INITIAL VITALS:  height is 5' (1.524 m) and weight is 140 lb (63.5 kg). Her oral temperature is 98.2 °F (36.8 °C). Her blood pressure is 118/61 and her pulse is 72. Her respiration is 17 and oxygen saturation is 94%.         Physical Exam    Vitals signs and nursing note reviewed. Constitutional:         Appearance: Normal appearance. HENT:       Head: Normocephalic and atraumatic.      Mouth/Throat:      Mouth: Mucous membranes are moist.      Pharynx: Oropharynx is clear. Eyes:       Extraocular Movements: Extraocular movements intact.      Conjunctiva/sclera: Conjunctivae normal.      Pupils: Pupils are equal, round, and reactive to light. Neck:       Musculoskeletal: Normal range of motion and neck supple. Cardiovascular:       Rate and Rhythm: Normal rate and regular rhythm.      Pulses: Normal pulses.      Heart sounds: Normal heart sounds. No murmur. Pulmonary:       Effort: Pulmonary effort is normal.      Breath sounds: Normal breath sounds. No stridor. No wheezing, rhonchi or rales. Abdominal:      General: Abdomen is flat. There is no distension.      Palpations: Abdomen is soft.      Tenderness: There is no abdominal tenderness. There is no guarding. Musculoskeletal: Normal range of motion.           General: No swelling or tenderness. Skin:       General: Skin is warm and dry.      Coloration: Skin is not pale.      Findings: No erythema or rash. Neurological:       General: No focal deficit present.      Mental Status: She is alert and oriented to person, place, and time. Mental status is at baseline.    Psychiatric:         Mood and Affect: Mood normal.         Behavior: Behavior normal.                 Ct Abdomen Pelvis Wo Contrast Additional Contrast? None         Result Date: 11/11/2020    EXAMINATION: CT OF THE ABDOMEN AND PELVIS WITHOUT CONTRAST 11/11/2020 3:40 pm TECHNIQUE: CT of the abdomen and pelvis was performed without the administration of intravenous contrast. Multiplanar reformatted images are provided for review. Dose modulation, iterative reconstruction, and/or weight based adjustment of the mA/kV was utilized to reduce the radiation dose to as low as reasonably achievable. COMPARISON: None. HISTORY: ORDERING SYSTEM PROVIDED HISTORY: elevated lipase TECHNOLOGIST PROVIDED HISTORY: elevated lipase Reason for Exam: elevated lipase Type of Exam: Unknown FINDINGS: Lower Chest: Linear scarring/atelectasis in the right upper and middle lobes. There are multiple bilateral noncalcified pulmonary nodules measuring up to 1.3 cm in the lingula.  Partially calcified left upper lobe nodule measures 1 cm in is partially visualized.  Coronary artery atherosclerosis. Organs: Within the limitations of a noncontrast examination, no acute abnormality within the liver, gallbladder, pancreas, adrenal glands, or kidneys. Lucetta Record is a 5.1 x 5.5 cm hyperattenuating lesion within the spleen with rim calcification. GI/Bowel: Moderate hiatal hernia.  Stomach is nondistended.  The small bowel is nondilated.  The colon is normal in caliber with a few scattered, noninflamed diverticula. Pelvis: Bladder is nondistended.  No vesicular stone.  Prior hysterectomy. Peritoneum/Retroperitoneum: No ascites or pneumoperitoneum.  Atherosclerosis of the nondilated abdominal aorta. Bones/Soft Tissues: Multilevel degenerative changes of the lumbar spine.  No acute osseous abnormality.  Degenerative changes within the left hip.         1. No acute intra-abdominal abnormality. 2. 5.5 cm hyperattenuating splenic lesion with peripheral calcification is indeterminate.  Multiphase MRI or CT may be useful for further characterization. 3. Bilateral pulmonary nodules measuring up to 1.3 cm.

## 2020-11-17 NOTE — DISCHARGE SUMMARY
CDU Discharge Summary        Patient:  Lexi Everett  YOB: 1940    MRN: [id number]   Acct: [de-identified]    Primary Care Physician: Franco Banegas    Admit date:  11/11/2020 12:16 PM  Discharge date: 11/16/2020  5:46 PM     Discharge Diagnoses:     Acute nausea and vomiting due to unknown etiology  Improved with antiemetics, IV fluids, lab monitoring, CT scan, MRI, GI consult, neurosurgery consult, medical oncology consult. Follow-up:  Call today/tomorrow for a follow up appointment with Franco Banegas , or return to the Emergency Room with worsening symptoms    Stressed to patient the importance of following up with primary care doctor for further workup/management of symptoms. Pt verbalizes understanding and agrees with plan. Discharge Medications:  Changes to medications as follows Scopolamine patch and Phenergan both for nausea        [facility] Medication Instructions GEN:[id number]    Printed on:11/17/20 4208   Medication Information                      amLODIPine (NORVASC) 5 MG tablet  Take 5 mg by mouth nightly              apixaban (ELIQUIS) 5 MG TABS tablet  Take 5 mg by mouth 2 times daily             cholestyramine light 4 g packet  Take 1 packet by mouth nightly             clonazePAM (KLONOPIN) 1 MG tablet  Take 1 mg by mouth nightly as needed Unknown dose.              dilTIAZem (CARDIZEM CD) 120 MG extended release capsule  Take 120 mg by mouth daily             FLUoxetine (PROZAC) 10 MG capsule  Take 20 mg by mouth daily              levothyroxine (SYNTHROID) 25 MCG tablet  Take 25 mcg by mouth Daily             metoprolol tartrate (LOPRESSOR) 25 MG tablet  Take 25 mg by mouth nightly Unknown dose              pantoprazole (PROTONIX) 40 MG tablet  Take 1 tablet by mouth every morning (before breakfast)             promethazine (PHENERGAN) 25 MG tablet  Take 1 tablet by mouth every 6 hours as needed for Nausea             scopolamine (TRANSDERM-SCOP) transdermal patch  Place 1 patch onto the skin every 72 hours for 3 doses                 Diet:  No diet orders on file , Advance as tolerated     Activity:  As tolerated    Consultants: IP CONSULT TO GI  IP CONSULT TO IV TEAM  IP CONSULT TO ONCOLOGY  IP CONSULT TO NEUROSURGERY  IP CONSULT TO SPIRITUAL SERVICES  IP CONSULT TO HOME CARE NEEDS    Procedures:  Not indicated     Diagnostic Test:   Results for orders placed or performed during the hospital encounter of 11/11/20   CBC   Result Value Ref Range    WBC 6.9 3.5 - 11.3 k/uL    RBC 5.18 (H) 3.95 - 5.11 m/uL    Hemoglobin 13.6 11.9 - 15.1 g/dL    Hematocrit 43.3 36.3 - 47.1 %    MCV 83.6 82.6 - 102.9 fL    MCH 26.3 25.2 - 33.5 pg    MCHC 31.4 28.4 - 34.8 g/dL    RDW 16.9 (H) 11.8 - 14.4 %    Platelets 506 726 - 618 k/uL    MPV 11.7 8.1 - 13.5 fL    NRBC Automated 0.0 0.0 per 100 WBC   Basic Metabolic Panel   Result Value Ref Range    Glucose 109 (H) 70 - 99 mg/dL    BUN 14 8 - 23 mg/dL    CREATININE 1.33 (H) 0.50 - 0.90 mg/dL    Bun/Cre Ratio NOT REPORTED 9 - 20    Calcium 9.3 8.6 - 10.4 mg/dL    Sodium 135 135 - 144 mmol/L    Potassium 3.8 3.7 - 5.3 mmol/L    Chloride 99 98 - 107 mmol/L    CO2 22 20 - 31 mmol/L    Anion Gap 14 9 - 17 mmol/L    GFR Non-African American 38 (L) >60 mL/min    GFR  46 (L) >60 mL/min    GFR Comment          GFR Staging NOT REPORTED    LIPASE   Result Value Ref Range    Lipase 233 (H) 13 - 60 U/L   Hepatic Function Panel   Result Value Ref Range    Alb 3.7 3.5 - 5.2 g/dL    Alkaline Phosphatase 91 35 - 104 U/L    ALT 8 5 - 33 U/L    AST 14 <32 U/L    Total Bilirubin 0.41 0.3 - 1.2 mg/dL    Bilirubin, Direct 0.18 <0.31 mg/dL    Bilirubin, Indirect 0.23 0.00 - 1.00 mg/dL    Total Protein 6.8 6.4 - 8.3 g/dL    Globulin NOT REPORTED 1.5 - 3.8 g/dL    Albumin/Globulin Ratio 1.2 1.0 - 2.5   URINALYSIS   Result Value Ref Range    Color, UA DARK YELLOW (A) YELLOW    Turbidity UA CLOUDY (A) CLEAR    Glucose, Ur NEGATIVE NEGATIVE    Bilirubin Urine NEGATIVE NEGATIVE    Ketones, Urine SMALL (A) NEGATIVE    Specific Gravity, UA 1.027 1.005 - 1.030    Urine Hgb TRACE (A) NEGATIVE    pH, UA 5.0 5.0 - 8.0    Protein, UA 4+ (A) NEGATIVE    Urobilinogen, Urine Normal Normal    Nitrite, Urine NEGATIVE NEGATIVE    Leukocyte Esterase, Urine TRACE (A) NEGATIVE    Urinalysis Comments NOT REPORTED    Troponin   Result Value Ref Range    Troponin, High Sensitivity 24 (H) 0 - 14 ng/L    Troponin T NOT REPORTED <0.03 ng/mL    Troponin Interp NOT REPORTED    Microscopic Urinalysis   Result Value Ref Range    -          WBC, UA 50  0 - 5 /HPF    RBC, UA 2 TO 5 0 - 4 /HPF    Casts UA  0 - 8 /LPF     10 TO 20 HYALINE Reference range defined for non-centrifuged specimen. Crystals, UA NOT REPORTED None /HPF    Epithelial Cells UA 5 TO 10 0 - 5 /HPF    Renal Epithelial, UA NOT REPORTED 0 /HPF    Bacteria, UA NOT REPORTED None    Mucus, UA NOT REPORTED None    Trichomonas, UA NOT REPORTED None    Amorphous, UA NOT REPORTED None    Other Observations UA NOT REPORTED NOT REQ.     Yeast, UA NOT REPORTED None   Troponin   Result Value Ref Range    Troponin, High Sensitivity 21 (H) 0 - 14 ng/L    Troponin T NOT REPORTED <0.03 ng/mL    Troponin Interp NOT REPORTED    LIPASE   Result Value Ref Range    Lipase 259 (H) 13 - 60 U/L   Basic Metabolic Panel   Result Value Ref Range    Glucose 104 (H) 70 - 99 mg/dL    BUN 6 (L) 8 - 23 mg/dL    CREATININE 1.09 (H) 0.50 - 0.90 mg/dL    Bun/Cre Ratio NOT REPORTED 9 - 20    Calcium 8.4 (L) 8.6 - 10.4 mg/dL    Sodium 139 135 - 144 mmol/L    Potassium 3.2 (L) 3.7 - 5.3 mmol/L    Chloride 106 98 - 107 mmol/L    CO2 22 20 - 31 mmol/L    Anion Gap 11 9 - 17 mmol/L    GFR Non-African American 48 (L) >60 mL/min    GFR  58 (L) >60 mL/min    GFR Comment          GFR Staging NOT REPORTED    COMPREHENSIVE METABOLIC PANEL   Result Value Ref Range    Glucose 114 (H) 70 - 99 mg/dL    BUN 5 (L) 8 - 23 mg/dL CREATININE 1.02 (H) 0.50 - 0.90 mg/dL    Bun/Cre Ratio NOT REPORTED 9 - 20    Calcium 8.7 8.6 - 10.4 mg/dL    Sodium 140 135 - 144 mmol/L    Potassium 3.3 (L) 3.7 - 5.3 mmol/L    Chloride 106 98 - 107 mmol/L    CO2 22 20 - 31 mmol/L    Anion Gap 12 9 - 17 mmol/L    Alkaline Phosphatase 71 35 - 104 U/L    ALT <5 (L) 5 - 33 U/L    AST 12 <32 U/L    Total Bilirubin 0.38 0.3 - 1.2 mg/dL    Total Protein 5.7 (L) 6.4 - 8.3 g/dL    Alb 3.0 (L) 3.5 - 5.2 g/dL    Albumin/Globulin Ratio 1.1 1.0 - 2.5    GFR Non-African American 52 (L) >60 mL/min    GFR African American >60 >60 mL/min    GFR Comment          GFR Staging NOT REPORTED    LIPASE   Result Value Ref Range    Lipase 154 (H) 13 - 60 U/L   EKG 12 Lead   Result Value Ref Range    Ventricular Rate 80 BPM    Atrial Rate 80 BPM    P-R Interval 140 ms    QRS Duration 84 ms    Q-T Interval 412 ms    QTc Calculation (Bazett) 475 ms    P Axis 88 degrees    R Axis 24 degrees    T Axis 51 degrees     Ct Abdomen Pelvis Wo Contrast Additional Contrast? None    Result Date: 11/11/2020  EXAMINATION: CT OF THE ABDOMEN AND PELVIS WITHOUT CONTRAST 11/11/2020 3:40 pm TECHNIQUE: CT of the abdomen and pelvis was performed without the administration of intravenous contrast. Multiplanar reformatted images are provided for review. Dose modulation, iterative reconstruction, and/or weight based adjustment of the mA/kV was utilized to reduce the radiation dose to as low as reasonably achievable. COMPARISON: None. HISTORY: ORDERING SYSTEM PROVIDED HISTORY: elevated lipase TECHNOLOGIST PROVIDED HISTORY: elevated lipase Reason for Exam: elevated lipase Type of Exam: Unknown FINDINGS: Lower Chest: Linear scarring/atelectasis in the right upper and middle lobes. There are multiple bilateral noncalcified pulmonary nodules measuring up to 1.3 cm in the lingula. Partially calcified left upper lobe nodule measures 1 cm in is partially visualized. Coronary artery atherosclerosis. Organs:  Within the performed without the administration of intravenous contrast. Multiplanar reformatted images are provided for review. Dose modulation, iterative reconstruction, and/or weight based adjustment of the mA/kV was utilized to reduce the radiation dose to as low as reasonably achievable. COMPARISON: CT abdomen and pelvis dated 11/11/2020 HISTORY: ORDERING SYSTEM PROVIDED HISTORY: Nodules noted on abdominal CT scan. TECHNOLOGIST PROVIDED HISTORY: Nodules noted on abdominal CT scan. Reason for Exam: Nodules noted on abdominal CT scan Acuity: Unknown Type of Exam: Unknown FINDINGS: Mediastinum: The heart is mildly prominent in size. There is no pericardial effusion. Thoracic aorta and pulmonary arteries are normal in caliber. There is moderate coronary artery calcification. No mediastinal or hilar lymphadenopathy. Lungs/pleura: There are multiple bilateral pulmonary nodules. Several 0.3-0.4 cm nodules are seen in the apices. There is a 0.9 cm nodule in the superior segment right lower lobe, image 46. There is a mildly spiculated 1.1 x 1.2 cm nodule in the left upper lobe, image 32. There is a 1.3 cm nodule in the lingula, image 71. Several additional scattered 0.4-0.5 cm nodules are seen in both lungs. There are linear opacities in the lingula, right upper lobe, and right middle lobe, likely atelectasis and/or scarring. No pneumothorax or pleural effusion. There is mild scattered atelectasis. Upper Abdomen: Calcified splenic lesion is again noted, previously described on CT abdomen and pelvis dated 11/11/2020. No acute findings in the visualized upper abdomen. There is a small hiatal hernia. Soft Tissues/Bones: There is a small indeterminate sclerotic focus at the T8 vertebral body. There is a heterogeneous lesion in the sternum, favored to be a hemangioma. No other focal bony lesions. No acute osseous abnormality.      1. Multiple bilateral pulmonary nodules, measuring up to 1.2 cm in the left upper lobe and 1.3 cm in the lingula. Follow-up CT chest recommended in 3-6 months. Alternatively, if clinically indicated, further evaluation with PET/CT or tissue sampling could be considered. 2. Redemonstration of the calcified splenic lesion, previously described in the CT abdomen and pelvis report dated 11/11/2020. 3. Small hiatal hernia. 4. Small indeterminate sclerotic focus in the T8 vertebral body. This may be a benign hemangioma, but if the patient has history of malignancy, metastatic focus could be considered. Mri Thoracic Spine W Wo Contrast    Result Date: 11/13/2020  EXAMINATION: MRI OF THE THORACIC SPINE WITHOUT AND WITH CONTRAST  11/13/2020 2:59 pm TECHNIQUE: Multiplanar multisequence MRI of the thoracic spine was performed without and with the administration of intravenous contrast. COMPARISON: None HISTORY: ORDERING SYSTEM PROVIDED HISTORY: T8 metastasis TECHNOLOGIST PROVIDED HISTORY: T8 metastasis Reason for Exam: t8 metastasis FINDINGS: BONES/ALIGNMENT: There is a normal thoracic kyphosis. The vertebral body heights are maintained. There is a T1/T2 hypointense focus in the T8 vertebral body that demonstrates peripheral edema and homogeneous enhancement. Overall this measures approximately 1.2 x 1.3 x 1.2 cm. There is no spondylolisthesis. SPINAL CORD: The spinal cord is normal in caliber and signal. SOFT TISSUES:  Posterior paraspinal soft tissues are unremarkable. Visualized thoracic and upper abdominal soft tissues are unremarkable DEGENERATIVE CHANGES: There is multilevel degenerative disc disease with loss of disc signal.  There is no disc space narrowing. There is no canal stenosis or foraminal narrowing. Enhancing focus in the T8 vertebral body consistent with patient's history a metastatic lesion.      Mri Brain W Wo Contrast    Result Date: 11/16/2020  EXAMINATION: MRI OF THE BRAIN WITHOUT AND WITH CONTRAST  11/16/2020 8:04 am TECHNIQUE: Multiplanar multisequence MRI of the head/brain was performed without and with the administration of intravenous contrast. COMPARISON: CT brain performed 11/12/2020. HISTORY: ORDERING SYSTEM PROVIDED HISTORY: persistent nausea, t8 lesion, concern for metastatic disease TECHNOLOGIST PROVIDED HISTORY: persistent nausea, t8 lesion, concern for metastatic disease FINDINGS: INTRACRANIAL STRUCTURES/VENTRICLES:  The sellar and suprasellar structures, optic chiasm, corpus callosum, pineal gland, tectum, and midline brainstem structures are unremarkable. The craniocervical junction is unremarkable. There is no acute hemorrhage, mass effect, or midline shift. There is satisfactory overall gray-white matter differentiation. There is chronic microvascular disease. The ventricular structures are symmetric and unremarkable. The infratentorial structures including the cerebellopontine angles and internal auditory canals are unremarkable. There is no abnormal restricted diffusion. There is no abnormal blooming artifact on susceptibility weighted imaging. No abnormal postcontrast enhancement. ORBITS: The visualized portion of the orbits demonstrate no acute abnormality. SINUSES: The visualized paranasal sinuses and mastoid air cells are well aerated. BONES/SOFT TISSUES: The bone marrow signal intensity appears normal. The soft tissues demonstrate no acute abnormality. Chronic microvascular disease without acute intracranial abnormality. No abnormal postcontrast enhancement. Physical Exam:    General appearance - NAD, AOx 3   Lungs -CTAB, no R/R/R  Heart - RRR, no M/R/G  Abdomen - Soft, NT/ND  Neurological:  MAEx4, No focal motor deficit, sensory loss  Extremities - Cap refil <2 sec in all ext., no edema  Skin -warm, dry      Hospital Course:  Clinical course has improved, labs and imaging reviewed. Gill Taylor originally presented to the hospital on 11/11/2020 12:16 PM. with acute worsening of chronic nausea and vomiting.   At that time it was determined that She required further observation and GI consult, antiemetics, neurosurgery consult, further imaging, oncology consult for outpatient management of future interventional radiology biopsy. Upon discharge she was stable, able to tolerate oral intake, discussed plan extensively with patient and family at bedside, instructed to hold Eliquis prior to interventional radiology bone biopsy, discussed with oncology team, all were compliant and stenting of plan for discharge with follow-up. She was admitted and labs and imaging were followed daily. Imaging results as above. She is medically stable to be discharged. Disposition: Home    Patient stated that they will not drive themselves home from the hospital if they have gotten pain killers/ narcotics earlier that day and that they will arrange for transportation on their own or work with the  for a ride. Patient counseled NOT to drive while under the influence of narcotics/ pain killers. Condition: Good    Patient stable and ready for discharge home. I have discussed plan of care with patient and they are in understanding. They were instructed to read discharge paperwork. All of their questions and concerns were addressed. Time Spent: 4 day      --  Kristi Patel DO  Emergency Medicine Resident Physician    This dictation was generated by voice recognition computer software. Although all attempts are made to edit the dictation for accuracy, there may be errors in the transcription that are not intended.

## 2022-02-04 NOTE — PROGRESS NOTES
Was called to bedside to discuss patient's care with patient's daughter. Patient's daughter reports she has been present with her mother for all of her recent hospital admissions and wanted to make sure that I was aware of all of her problems and work-ups that have been done previously. Patient's daughter is very adamant that mother 2 years ago was very active and this is a drastic change for her over the past 2 years. She reports mother is no longer active and participating in things and just has not felt well overall due to the chronic abdominal pain. Patient reportedly has been worked up for pancreatitis before as well as UTIs and was eventually diagnosed with gastritis. Patient's daughter reports she is trying to cut out patient's medications at home and replacing them with herbal supplements most specifically a supplement that combines CBD and THC to help with patient's pain, appetite and sleeping. Patient's daughter does feel that she is on the spectrum a very holistic and is worried about all of the medications patient is getting and then causing toxins to build up in her body that are resulting in all of patient's symptoms. Patient does not have a history of dementia but was acting slightly confused this evening but was very redirectable. Daughter was reassured and had lengthy discussion with her that we will provide the best care to her mother. I will have the day team call the daughter tomorrow as she would like to speak with them further about her mother's care.     Electronically signed by Terence Hunter DO on 11/11/2020 at 11:48 PM 37 yo f w hx of tobacco use (quit 1 month ago) presents to ED with concern for left sided chest discomfort, persistent, worse with deep inspiration.  Went to urgent care and had labs completed with elevated d-dimer - sent to ED for CTA.  On exam, patient is non toxic in appearance.  HRRR, lungs clear.  Abd soft, NT/ND.  Will obtain labs, CTA chest and dispo accordingly.

## (undated) DEVICE — FORCEP BX MESH TOOTH MIC 2.8 MMX240 CM NDL STRL RADIAL JAW 4